# Patient Record
Sex: MALE | Race: OTHER | NOT HISPANIC OR LATINO | URBAN - METROPOLITAN AREA
[De-identification: names, ages, dates, MRNs, and addresses within clinical notes are randomized per-mention and may not be internally consistent; named-entity substitution may affect disease eponyms.]

---

## 2023-06-10 ENCOUNTER — HOSPITAL ENCOUNTER (INPATIENT)
Facility: HOSPITAL | Age: 88
LOS: 2 days | Discharge: HOME HEALTH | DRG: 193 | End: 2023-06-13
Attending: EMERGENCY MEDICINE | Admitting: INTERNAL MEDICINE
Payer: COMMERCIAL

## 2023-06-10 DIAGNOSIS — J18.9 PNEUMONIA: Primary | ICD-10-CM

## 2023-06-10 DIAGNOSIS — R05.9 COUGH: ICD-10-CM

## 2023-06-10 DIAGNOSIS — Z99.81 ON SUPPLEMENTAL OXYGEN THERAPY: ICD-10-CM

## 2023-06-10 DIAGNOSIS — R77.8 ELEVATED TROPONIN LEVEL: ICD-10-CM

## 2023-06-10 DIAGNOSIS — R06.02 SOB (SHORTNESS OF BREATH): ICD-10-CM

## 2023-06-10 DIAGNOSIS — R09.02 HYPOXIA: ICD-10-CM

## 2023-06-10 PROCEDURE — 99285 EMERGENCY DEPT VISIT HI MDM: CPT

## 2023-06-10 PROCEDURE — 93005 ELECTROCARDIOGRAM TRACING: CPT

## 2023-06-11 ENCOUNTER — APPOINTMENT (INPATIENT)
Dept: ULTRASOUND IMAGING | Facility: HOSPITAL | Age: 88
DRG: 193 | End: 2023-06-11
Payer: COMMERCIAL

## 2023-06-11 ENCOUNTER — APPOINTMENT (EMERGENCY)
Dept: CT IMAGING | Facility: HOSPITAL | Age: 88
DRG: 193 | End: 2023-06-11
Payer: COMMERCIAL

## 2023-06-11 PROBLEM — R79.89 ELEVATED LFTS: Status: ACTIVE | Noted: 2023-06-11

## 2023-06-11 PROBLEM — I71.40 ABDOMINAL AORTIC ANEURYSM (AAA) WITHOUT RUPTURE (HCC): Status: ACTIVE | Noted: 2023-06-11

## 2023-06-11 PROBLEM — J44.9 MODERATE COPD (CHRONIC OBSTRUCTIVE PULMONARY DISEASE) (HCC): Status: ACTIVE | Noted: 2023-06-11

## 2023-06-11 PROBLEM — I25.119 CORONARY ARTERY DISEASE INVOLVING NATIVE CORONARY ARTERY OF NATIVE HEART WITH ANGINA PECTORIS (HCC): Chronic | Status: ACTIVE | Noted: 2023-06-11

## 2023-06-11 PROBLEM — Z86.79 HISTORY OF ATRIAL FIBRILLATION: Status: ACTIVE | Noted: 2023-06-11

## 2023-06-11 PROBLEM — I27.20 PULMONARY HYPERTENSION (HCC): Chronic | Status: ACTIVE | Noted: 2023-06-11

## 2023-06-11 PROBLEM — R77.8 ELEVATED TROPONIN: Status: ACTIVE | Noted: 2023-06-11

## 2023-06-11 PROBLEM — J18.9 COMMUNITY ACQUIRED PNEUMONIA: Status: ACTIVE | Noted: 2023-06-11

## 2023-06-11 PROBLEM — J96.11 CHRONIC RESPIRATORY FAILURE WITH HYPOXIA (HCC): Chronic | Status: ACTIVE | Noted: 2023-06-11

## 2023-06-11 LAB
2HR DELTA HS TROPONIN: 2 NG/L
4HR DELTA HS TROPONIN: -1 NG/L
ALBUMIN SERPL BCP-MCNC: 4.2 G/DL (ref 3.5–5)
ALP SERPL-CCNC: 162 U/L (ref 34–104)
ALT SERPL W P-5'-P-CCNC: 61 U/L (ref 7–52)
ANION GAP SERPL CALCULATED.3IONS-SCNC: 9 MMOL/L (ref 4–13)
AST SERPL W P-5'-P-CCNC: 57 U/L (ref 13–39)
ATRIAL RATE: 76 BPM
ATRIAL RATE: 80 BPM
BASOPHILS # BLD AUTO: 0.02 THOUSANDS/ÂΜL (ref 0–0.1)
BASOPHILS NFR BLD AUTO: 0 % (ref 0–1)
BUN SERPL-MCNC: 19 MG/DL (ref 5–25)
CALCIUM SERPL-MCNC: 9.6 MG/DL (ref 8.4–10.2)
CARDIAC TROPONIN I PNL SERPL HS: 49 NG/L
CARDIAC TROPONIN I PNL SERPL HS: 50 NG/L
CARDIAC TROPONIN I PNL SERPL HS: 52 NG/L
CHLORIDE SERPL-SCNC: 102 MMOL/L (ref 96–108)
CO2 SERPL-SCNC: 25 MMOL/L (ref 21–32)
CREAT SERPL-MCNC: 1.2 MG/DL (ref 0.6–1.3)
D DIMER PPP FEU-MCNC: 4.42 UG/ML FEU
EOSINOPHIL # BLD AUTO: 0.05 THOUSAND/ÂΜL (ref 0–0.61)
EOSINOPHIL NFR BLD AUTO: 0 % (ref 0–6)
ERYTHROCYTE [DISTWIDTH] IN BLOOD BY AUTOMATED COUNT: 12.9 % (ref 11.6–15.1)
FLUAV RNA RESP QL NAA+PROBE: NEGATIVE
FLUBV RNA RESP QL NAA+PROBE: NEGATIVE
GFR SERPL CREATININE-BSD FRML MDRD: 51 ML/MIN/1.73SQ M
GLUCOSE SERPL-MCNC: 122 MG/DL (ref 65–140)
HCT VFR BLD AUTO: 44.5 % (ref 36.5–49.3)
HGB BLD-MCNC: 14.6 G/DL (ref 12–17)
IMM GRANULOCYTES # BLD AUTO: 0.04 THOUSAND/UL (ref 0–0.2)
IMM GRANULOCYTES NFR BLD AUTO: 0 % (ref 0–2)
L PNEUMO1 AG UR QL IA.RAPID: NEGATIVE
LYMPHOCYTES # BLD AUTO: 2.22 THOUSANDS/ÂΜL (ref 0.6–4.47)
LYMPHOCYTES NFR BLD AUTO: 20 % (ref 14–44)
MCH RBC QN AUTO: 29.9 PG (ref 26.8–34.3)
MCHC RBC AUTO-ENTMCNC: 32.8 G/DL (ref 31.4–37.4)
MCV RBC AUTO: 91 FL (ref 82–98)
MONOCYTES # BLD AUTO: 1.22 THOUSAND/ÂΜL (ref 0.17–1.22)
MONOCYTES NFR BLD AUTO: 11 % (ref 4–12)
NEUTROPHILS # BLD AUTO: 7.66 THOUSANDS/ÂΜL (ref 1.85–7.62)
NEUTS SEG NFR BLD AUTO: 69 % (ref 43–75)
NRBC BLD AUTO-RTO: 0 /100 WBCS
P AXIS: 50 DEGREES
PLATELET # BLD AUTO: 213 THOUSANDS/UL (ref 149–390)
PMV BLD AUTO: 10.8 FL (ref 8.9–12.7)
POTASSIUM SERPL-SCNC: 4.3 MMOL/L (ref 3.5–5.3)
PR INTERVAL: 300 MS
PROCALCITONIN SERPL-MCNC: 0.18 NG/ML
PROT SERPL-MCNC: 7.3 G/DL (ref 6.4–8.4)
QRS AXIS: 51 DEGREES
QRS AXIS: 54 DEGREES
QRSD INTERVAL: 148 MS
QRSD INTERVAL: 164 MS
QT INTERVAL: 402 MS
QT INTERVAL: 444 MS
QTC INTERVAL: 463 MS
QTC INTERVAL: 499 MS
RBC # BLD AUTO: 4.88 MILLION/UL (ref 3.88–5.62)
RSV RNA RESP QL NAA+PROBE: NEGATIVE
S PNEUM AG UR QL: NEGATIVE
SARS-COV-2 RNA RESP QL NAA+PROBE: NEGATIVE
SODIUM SERPL-SCNC: 136 MMOL/L (ref 135–147)
T WAVE AXIS: 187 DEGREES
T WAVE AXIS: 196 DEGREES
VENTRICULAR RATE: 76 BPM
VENTRICULAR RATE: 80 BPM
WBC # BLD AUTO: 11.21 THOUSAND/UL (ref 4.31–10.16)

## 2023-06-11 PROCEDURE — 93010 ELECTROCARDIOGRAM REPORT: CPT

## 2023-06-11 PROCEDURE — 74177 CT ABD & PELVIS W/CONTRAST: CPT

## 2023-06-11 PROCEDURE — 96360 HYDRATION IV INFUSION INIT: CPT

## 2023-06-11 PROCEDURE — G1004 CDSM NDSC: HCPCS

## 2023-06-11 PROCEDURE — 36415 COLL VENOUS BLD VENIPUNCTURE: CPT

## 2023-06-11 PROCEDURE — 99223 1ST HOSP IP/OBS HIGH 75: CPT | Performed by: INTERNAL MEDICINE

## 2023-06-11 PROCEDURE — 85379 FIBRIN DEGRADATION QUANT: CPT

## 2023-06-11 PROCEDURE — 84484 ASSAY OF TROPONIN QUANT: CPT

## 2023-06-11 PROCEDURE — 87449 NOS EACH ORGANISM AG IA: CPT | Performed by: NURSE PRACTITIONER

## 2023-06-11 PROCEDURE — 93005 ELECTROCARDIOGRAM TRACING: CPT

## 2023-06-11 PROCEDURE — 96361 HYDRATE IV INFUSION ADD-ON: CPT

## 2023-06-11 PROCEDURE — 76705 ECHO EXAM OF ABDOMEN: CPT

## 2023-06-11 PROCEDURE — 80053 COMPREHEN METABOLIC PANEL: CPT

## 2023-06-11 PROCEDURE — 71275 CT ANGIOGRAPHY CHEST: CPT

## 2023-06-11 PROCEDURE — 84145 PROCALCITONIN (PCT): CPT | Performed by: NURSE PRACTITIONER

## 2023-06-11 PROCEDURE — 85025 COMPLETE CBC W/AUTO DIFF WBC: CPT

## 2023-06-11 PROCEDURE — 0241U HB NFCT DS VIR RESP RNA 4 TRGT: CPT

## 2023-06-11 RX ORDER — NITROGLYCERIN 0.4 MG/1
0.4 TABLET SUBLINGUAL
Status: DISCONTINUED | OUTPATIENT
Start: 2023-06-11 | End: 2023-06-13 | Stop reason: HOSPADM

## 2023-06-11 RX ORDER — ALBUTEROL SULFATE 90 UG/1
2 AEROSOL, METERED RESPIRATORY (INHALATION) 4 TIMES DAILY
Status: DISCONTINUED | OUTPATIENT
Start: 2023-06-11 | End: 2023-06-13 | Stop reason: HOSPADM

## 2023-06-11 RX ORDER — LEVALBUTEROL INHALATION SOLUTION 0.63 MG/3ML
0.63 SOLUTION RESPIRATORY (INHALATION) 4 TIMES DAILY PRN
Status: DISCONTINUED | OUTPATIENT
Start: 2023-06-11 | End: 2023-06-13 | Stop reason: HOSPADM

## 2023-06-11 RX ORDER — CEFTRIAXONE 1 G/50ML
1000 INJECTION, SOLUTION INTRAVENOUS ONCE
Status: COMPLETED | OUTPATIENT
Start: 2023-06-11 | End: 2023-06-11

## 2023-06-11 RX ORDER — FLUTICASONE PROPIONATE AND SALMETEROL 250; 50 UG/1; UG/1
1 POWDER RESPIRATORY (INHALATION) 2 TIMES DAILY
COMMUNITY

## 2023-06-11 RX ORDER — GUAIFENESIN/DEXTROMETHORPHAN 100-10MG/5
10 SYRUP ORAL EVERY 4 HOURS PRN
Status: DISCONTINUED | OUTPATIENT
Start: 2023-06-11 | End: 2023-06-13 | Stop reason: HOSPADM

## 2023-06-11 RX ORDER — FINASTERIDE 5 MG/1
5 TABLET, FILM COATED ORAL DAILY
Status: DISCONTINUED | OUTPATIENT
Start: 2023-06-11 | End: 2023-06-13 | Stop reason: HOSPADM

## 2023-06-11 RX ORDER — FUROSEMIDE 20 MG/1
20 TABLET ORAL DAILY
COMMUNITY

## 2023-06-11 RX ORDER — FLUTICASONE FUROATE AND VILANTEROL 200; 25 UG/1; UG/1
1 POWDER RESPIRATORY (INHALATION) DAILY
Status: DISCONTINUED | OUTPATIENT
Start: 2023-06-11 | End: 2023-06-13 | Stop reason: HOSPADM

## 2023-06-11 RX ORDER — LEVOTHYROXINE SODIUM 0.07 MG/1
75 TABLET ORAL
Status: DISCONTINUED | OUTPATIENT
Start: 2023-06-11 | End: 2023-06-13 | Stop reason: HOSPADM

## 2023-06-11 RX ORDER — SIMVASTATIN 10 MG
TABLET ORAL
COMMUNITY

## 2023-06-11 RX ORDER — FINASTERIDE 5 MG/1
5 TABLET, FILM COATED ORAL DAILY
COMMUNITY

## 2023-06-11 RX ORDER — PANTOPRAZOLE SODIUM 20 MG/1
20 TABLET, DELAYED RELEASE ORAL
Status: DISCONTINUED | OUTPATIENT
Start: 2023-06-12 | End: 2023-06-13 | Stop reason: HOSPADM

## 2023-06-11 RX ORDER — ACETAMINOPHEN 325 MG/1
650 TABLET ORAL EVERY 6 HOURS PRN
Status: DISCONTINUED | OUTPATIENT
Start: 2023-06-11 | End: 2023-06-13 | Stop reason: HOSPADM

## 2023-06-11 RX ORDER — LEVOTHYROXINE SODIUM 0.07 MG/1
75 TABLET ORAL
COMMUNITY

## 2023-06-11 RX ORDER — SIMETHICONE 80 MG
80 TABLET,CHEWABLE ORAL 4 TIMES DAILY PRN
Status: DISCONTINUED | OUTPATIENT
Start: 2023-06-11 | End: 2023-06-13 | Stop reason: HOSPADM

## 2023-06-11 RX ORDER — LEVALBUTEROL INHALATION SOLUTION 0.63 MG/3ML
SOLUTION RESPIRATORY (INHALATION)
COMMUNITY
Start: 2023-03-20

## 2023-06-11 RX ORDER — PRAVASTATIN SODIUM 40 MG
40 TABLET ORAL
Status: DISCONTINUED | OUTPATIENT
Start: 2023-06-11 | End: 2023-06-13 | Stop reason: HOSPADM

## 2023-06-11 RX ORDER — MAGNESIUM HYDROXIDE/ALUMINUM HYDROXICE/SIMETHICONE 120; 1200; 1200 MG/30ML; MG/30ML; MG/30ML
30 SUSPENSION ORAL EVERY 6 HOURS PRN
Status: DISCONTINUED | OUTPATIENT
Start: 2023-06-11 | End: 2023-06-13 | Stop reason: HOSPADM

## 2023-06-11 RX ORDER — LOSARTAN POTASSIUM 25 MG/1
25 TABLET ORAL DAILY
Status: DISCONTINUED | OUTPATIENT
Start: 2023-06-11 | End: 2023-06-13 | Stop reason: HOSPADM

## 2023-06-11 RX ORDER — AMLODIPINE BESYLATE 5 MG/1
5 TABLET ORAL DAILY
Status: DISCONTINUED | OUTPATIENT
Start: 2023-06-11 | End: 2023-06-13 | Stop reason: HOSPADM

## 2023-06-11 RX ORDER — HEPARIN SODIUM 5000 [USP'U]/ML
5000 INJECTION, SOLUTION INTRAVENOUS; SUBCUTANEOUS EVERY 8 HOURS SCHEDULED
Status: DISCONTINUED | OUTPATIENT
Start: 2023-06-11 | End: 2023-06-13 | Stop reason: HOSPADM

## 2023-06-11 RX ORDER — CEFTRIAXONE 1 G/50ML
1000 INJECTION, SOLUTION INTRAVENOUS EVERY 24 HOURS
Status: DISCONTINUED | OUTPATIENT
Start: 2023-06-12 | End: 2023-06-13 | Stop reason: HOSPADM

## 2023-06-11 RX ORDER — HYDROCHLOROTHIAZIDE 25 MG/1
25 TABLET ORAL DAILY
COMMUNITY

## 2023-06-11 RX ORDER — CANDESARTAN 4 MG/1
16 TABLET ORAL DAILY
COMMUNITY

## 2023-06-11 RX ORDER — OMEPRAZOLE 20 MG/1
CAPSULE, DELAYED RELEASE ORAL
COMMUNITY

## 2023-06-11 RX ORDER — FUROSEMIDE 20 MG/1
20 TABLET ORAL DAILY
Status: DISCONTINUED | OUTPATIENT
Start: 2023-06-11 | End: 2023-06-13 | Stop reason: HOSPADM

## 2023-06-11 RX ORDER — AMLODIPINE BESYLATE 5 MG/1
5 TABLET ORAL DAILY
COMMUNITY

## 2023-06-11 RX ORDER — GUAIFENESIN 600 MG/1
600 TABLET, EXTENDED RELEASE ORAL 2 TIMES DAILY
Status: DISCONTINUED | OUTPATIENT
Start: 2023-06-11 | End: 2023-06-13 | Stop reason: HOSPADM

## 2023-06-11 RX ORDER — ONDANSETRON 2 MG/ML
4 INJECTION INTRAMUSCULAR; INTRAVENOUS EVERY 6 HOURS PRN
Status: DISCONTINUED | OUTPATIENT
Start: 2023-06-11 | End: 2023-06-13 | Stop reason: HOSPADM

## 2023-06-11 RX ORDER — AZITHROMYCIN 250 MG/1
250 TABLET, FILM COATED ORAL DAILY
Status: DISCONTINUED | OUTPATIENT
Start: 2023-06-11 | End: 2023-06-11

## 2023-06-11 RX ORDER — NITROGLYCERIN 0.4 MG/1
0.4 TABLET SUBLINGUAL
COMMUNITY

## 2023-06-11 RX ORDER — LEVOTHYROXINE SODIUM 0.07 MG/1
75 TABLET ORAL DAILY
COMMUNITY
End: 2023-06-11 | Stop reason: CLARIF

## 2023-06-11 RX ORDER — AZITHROMYCIN 250 MG/1
500 TABLET, FILM COATED ORAL DAILY
Status: DISCONTINUED | OUTPATIENT
Start: 2023-06-12 | End: 2023-06-13 | Stop reason: HOSPADM

## 2023-06-11 RX ORDER — AZITHROMYCIN 250 MG/1
250 TABLET, FILM COATED ORAL DAILY
COMMUNITY
Start: 2023-05-05 | End: 2024-05-04

## 2023-06-11 RX ORDER — MINERAL OIL AND PETROLATUM 150; 830 MG/G; MG/G
OINTMENT OPHTHALMIC
Status: DISCONTINUED | OUTPATIENT
Start: 2023-06-11 | End: 2023-06-13 | Stop reason: HOSPADM

## 2023-06-11 RX ORDER — ASPIRIN 325 MG
325 TABLET ORAL ONCE
Status: COMPLETED | OUTPATIENT
Start: 2023-06-11 | End: 2023-06-11

## 2023-06-11 RX ADMIN — FUROSEMIDE 20 MG: 20 TABLET ORAL at 10:02

## 2023-06-11 RX ADMIN — ALBUTEROL SULFATE 2 PUFF: 90 AEROSOL, METERED RESPIRATORY (INHALATION) at 10:12

## 2023-06-11 RX ADMIN — ALBUTEROL SULFATE 2 PUFF: 90 AEROSOL, METERED RESPIRATORY (INHALATION) at 17:06

## 2023-06-11 RX ADMIN — ALBUTEROL SULFATE 2 PUFF: 90 AEROSOL, METERED RESPIRATORY (INHALATION) at 21:48

## 2023-06-11 RX ADMIN — GLYCERIN, HYPROMELLOSE, POLYETHYLENE GLYCOL 1 DROP: .2; .2; 1 LIQUID OPHTHALMIC at 13:20

## 2023-06-11 RX ADMIN — ASPIRIN 325 MG ORAL TABLET 325 MG: 325 PILL ORAL at 02:49

## 2023-06-11 RX ADMIN — PRAVASTATIN SODIUM 40 MG: 40 TABLET ORAL at 17:06

## 2023-06-11 RX ADMIN — AMLODIPINE BESYLATE 5 MG: 5 TABLET ORAL at 10:02

## 2023-06-11 RX ADMIN — CEFTRIAXONE 1000 MG: 1 INJECTION, SOLUTION INTRAVENOUS at 06:16

## 2023-06-11 RX ADMIN — GUAIFENESIN 600 MG: 600 TABLET, EXTENDED RELEASE ORAL at 17:06

## 2023-06-11 RX ADMIN — LOSARTAN POTASSIUM 25 MG: 25 TABLET, FILM COATED ORAL at 10:02

## 2023-06-11 RX ADMIN — SODIUM CHLORIDE 500 ML: 0.9 INJECTION, SOLUTION INTRAVENOUS at 03:15

## 2023-06-11 RX ADMIN — FLUTICASONE FUROATE AND VILANTEROL TRIFENATATE 1 PUFF: 200; 25 POWDER RESPIRATORY (INHALATION) at 10:12

## 2023-06-11 RX ADMIN — GLYCERIN, HYPROMELLOSE, POLYETHYLENE GLYCOL 1 DROP: .2; .2; 1 LIQUID OPHTHALMIC at 17:06

## 2023-06-11 RX ADMIN — HEPARIN SODIUM 5000 UNITS: 5000 INJECTION INTRAVENOUS; SUBCUTANEOUS at 21:48

## 2023-06-11 RX ADMIN — ALBUTEROL SULFATE 2 PUFF: 90 AEROSOL, METERED RESPIRATORY (INHALATION) at 13:27

## 2023-06-11 RX ADMIN — GUAIFENESIN 600 MG: 600 TABLET, EXTENDED RELEASE ORAL at 10:02

## 2023-06-11 RX ADMIN — GLYCERIN, HYPROMELLOSE, POLYETHYLENE GLYCOL 1 DROP: .2; .2; 1 LIQUID OPHTHALMIC at 21:48

## 2023-06-11 RX ADMIN — FINASTERIDE 5 MG: 5 TABLET, FILM COATED ORAL at 10:02

## 2023-06-11 RX ADMIN — HEPARIN SODIUM 5000 UNITS: 5000 INJECTION INTRAVENOUS; SUBCUTANEOUS at 13:20

## 2023-06-11 RX ADMIN — WHITE PETROLATUM 57.7 %-MINERAL OIL 31.9 % EYE OINTMENT: at 21:48

## 2023-06-11 RX ADMIN — IOHEXOL 100 ML: 350 INJECTION, SOLUTION INTRAVENOUS at 03:19

## 2023-06-11 NOTE — PLAN OF CARE
Problem: PAIN - ADULT  Goal: Verbalizes/displays adequate comfort level or baseline comfort level  Description: Interventions:  - Encourage patient to monitor pain and request assistance  - Assess pain using appropriate pain scale  - Administer analgesics based on type and severity of pain and evaluate response  - Implement non-pharmacological measures as appropriate and evaluate response  - Consider cultural and social influences on pain and pain management  - Notify physician/advanced practitioner if interventions unsuccessful or patient reports new pain  Outcome: Progressing     Problem: INFECTION - ADULT  Goal: Absence or prevention of progression during hospitalization  Description: INTERVENTIONS:  - Assess and monitor for signs and symptoms of infection  - Monitor lab/diagnostic results  - Monitor all insertion sites, i e  indwelling lines, tubes, and drains  - Monitor endotracheal if appropriate and nasal secretions for changes in amount and color  - [place] appropriate cooling/warming therapies per order  - Administer medications as ordered  - Instruct and encourage patient and family to use good hand hygiene technique  - Identify and instruct in appropriate isolation precautions for identified infection/condition  Outcome: Progressing  Goal: Absence of fever/infection during neutropenic period  Description: INTERVENTIONS:  - Monitor WBC    Outcome: Progressing     Problem: SAFETY ADULT  Goal: Patient will remain free of falls  Description: INTERVENTIONS:  - Educate patient/family on patient safety including physical limitations  - Instruct patient to call for assistance with activity   - Consult OT/PT to assist with strengthening/mobility   - Keep Call bell within reach  - Keep bed low and locked with side rails adjusted as appropriate  - Keep care items and personal belongings within reach  - Initiate and maintain comfort rounds  - Make Fall Risk Sign visible to staff  - Offer Toileting every  Hours, in advance of need  - Initiate/Maintain alarm  - Obtain necessary fall risk management equipment:   - Apply yellow socks and bracelet for high fall risk patients  - Consider moving patient to room near nurses station  Outcome: Progressing  Goal: Maintain or return to baseline ADL function  Description: INTERVENTIONS:  -  Assess patient's ability to carry out ADLs; assess patient's baseline for ADL function and identify physical deficits which impact ability to perform ADLs (bathing, care of mouth/teeth, toileting, grooming, dressing, etc )  - Assess/evaluate cause of self-care deficits   - Assess range of motion  - Assess patient's mobility; develop plan if impaired  - Assess patient's need for assistive devices and provide as appropriate  - Encourage maximum independence but intervene and supervise when necessary  - Involve family in performance of ADLs  - Assess for home care needs following discharge   - Consider OT consult to assist with ADL evaluation and planning for discharge  - Provide patient education as appropriate  Outcome: Progressing  Goal: Maintains/Returns to pre admission functional level  Description: INTERVENTIONS:  - Perform BMAT or MOVE assessment daily    - Set and communicate daily mobility goal to care team and patient/family/caregiver  - Collaborate with rehabilitation services on mobility goals if consulted  - Perform Range of Motion  times a day  - Reposition patient every  hours    - Dangle patient  times a day  - Stand patient  times a day  - Ambulate patient  times a day  - Out of bed to chair  times a day   - Out of bed for meals times a day  - Out of bed for toileting  - Record patient progress and toleration of activity level   Outcome: Progressing     Problem: DISCHARGE PLANNING  Goal: Discharge to home or other facility with appropriate resources  Description: INTERVENTIONS:  - Identify barriers to discharge w/patient and caregiver  - Arrange for needed discharge resources and transportation as appropriate  - Identify discharge learning needs (meds, wound care, etc )  - Arrange for interpretive services to assist at discharge as needed  - Refer to Case Management Department for coordinating discharge planning if the patient needs post-hospital services based on physician/advanced practitioner order or complex needs related to functional status, cognitive ability, or social support system  Outcome: Progressing     Problem: Knowledge Deficit  Goal: Patient/family/caregiver demonstrates understanding of disease process, treatment plan, medications, and discharge instructions  Description: Complete learning assessment and assess knowledge base    Interventions:  - Provide teaching at level of understanding  - Provide teaching via preferred learning methods  Outcome: Progressing

## 2023-06-11 NOTE — ASSESSMENT & PLAN NOTE
· Troponin 50-->52  · Received ASA bolus in ED  · EKG: Sinus rhythm with first-degree AV block, RBBB rate 76    Similar to EKG from [facility]   · ECHO EF 60-65%, normal wall motion  · Cycle troponin and EKG  · Monitor on telemetry

## 2023-06-11 NOTE — ED PROVIDER NOTES
History  Chief Complaint   Patient presents with   • Shortness of Breath     Patient arrives via ems coming from home c/o of sob worsening  tonight, patient received 125 solumederol, and duo neb pta     81 y/o male with PMH of CHF, COPD, HTN, CAD, s/p aortic valve replacement presenting to the ED via EMS with complaints of shortness of breath and cough x1 week  Patient is accompanied by wife who provides majority of history  Wife states the patient developed worsening cough producing yellow sputum over past few days  States the patient was diaphoretic at home and decided to be evaluated in ED  He has been subjectively feverish at home however states that the patient is always feeling warm  Patient uses at home COPD treatments w/ little relief of SOB  Per patients wife, he has never been hospitalized for COPD exacerbation before  Has intermittent wheezing but no significant wheeze today  Denies lightheadedness, headache, confusion, chest pain, palpitations, PND, pleuritic chest pain, history of PE/DVT, bleeding/clotting disorder, family history of PE/DVT, hemoptysis, recent prolonged travel, recent injury/trauma to the lower extremities, recent surgery, history of cancer, estrogen therapy, N/V/D, urinary complaints, weakness, confusion and any other complaint  States he does sometimes get right lower leg swelling however attributes it to his knee issues on the right  Has otherwise been eating and drinking normally  No known sick contacts or recent travel outside the [address] [facility] [address]  Patient quit smoking over 20 years ago  Was given 1 DuoNeb treatment and 125 mg Solu-Medrol by EMS in route to the ED  Prior to Admission Medications   Prescriptions Last Dose Informant Patient Reported? Taking?    Cholecalciferol 25 MCG (1000 UT) CHEW   Yes No   Sig: Chew   Fluticasone-Salmeterol (Advair Diskus) 250-50 mcg/dose inhaler   Yes No   Si puff 2 (two) times a day   amLODIPine (NORVASC) 5 mg tablet   Yes No   Sig: Take 5 mg by mouth daily   azithromycin (ZITHROMAX) 250 mg tablet   Yes Yes   Sig: Take 250 mg by mouth daily   candesartan (ATACAND) 4 mg tablet   Yes No   Sig: Take 16 mg by mouth daily   finasteride (PROSCAR) 5 mg tablet   Yes No   Sig: Take 5 mg by mouth daily   furosemide (LASIX) 20 mg tablet   Yes Yes   Sig: Take 20 mg by mouth daily   hydrochlorothiazide (HYDRODIURIL) 25 mg tablet   Yes No   Sig: Take 25 mg by mouth daily   ipratropium-albuterol (COMBIVENT RESPIMAT) inhaler   Yes No   Si puff 4 (four) times a day as needed   levalbuterol (Xopenex) 0 63 mg/3 mL nebulizer solution   Yes Yes   levothyroxine 75 mcg tablet   Yes No   Sig: Take 75 mcg by mouth daily in the early morning   nitroglycerin (NITROSTAT) 0 4 mg SL tablet   Yes Yes   Sig: Place 0 4 mg under the tongue every 5 (five) minutes as needed for chest pain   omeprazole (PriLOSEC) 20 mg delayed release capsule   Yes No   Sig: Take by mouth   simvastatin (ZOCOR) 10 mg tablet   Yes No   Sig: Take by mouth      Facility-Administered Medications: None       No past medical history on file  No past surgical history on file  No family history on file  I have reviewed and agree with the history as documented  No existing history information found  No existing history information found  Review of Systems   Constitutional: Positive for fever  Respiratory: Positive for cough and shortness of breath  All other systems reviewed and are negative  Physical Exam  Physical Exam  Vitals and nursing note reviewed  Constitutional:       General: He is not in acute distress  Appearance: He is well-developed  Comments: Awake, alert, interactive and resting in the stretcher in no acute distress  Patient is not ill or toxic appearing  HENT:      Head: Normocephalic and atraumatic  Mouth/Throat:      Mouth: Mucous membranes are moist       Pharynx: Oropharynx is clear     Eyes:      Conjunctiva/sclera: Conjunctivae normal  Neck:      Vascular: No JVD  Cardiovascular:      Rate and Rhythm: Normal rate and regular rhythm  Heart sounds: No murmur heard  Pulmonary:      Effort: Pulmonary effort is normal  Tachypnea present  No respiratory distress  Breath sounds: No stridor  Examination of the left-upper field reveals decreased breath sounds  Examination of the left-lower field reveals decreased breath sounds  Decreased breath sounds present  No wheezing, rhonchi or rales  Abdominal:      Palpations: Abdomen is soft  Tenderness: There is no abdominal tenderness  Musculoskeletal:         General: No swelling  Cervical back: Neck supple  Right lower leg: No tenderness  No edema  Left lower leg: No tenderness  No edema  Lymphadenopathy:      Cervical: No cervical adenopathy  Skin:     General: Skin is warm and dry  Capillary Refill: Capillary refill takes less than 2 seconds  Neurological:      General: No focal deficit present  Mental Status: He is alert and oriented to person, place, and time  GCS: GCS eye subscore is 4  GCS verbal subscore is 5  GCS motor subscore is 6     Psychiatric:         Mood and Affect: Mood normal          Vital Signs  ED Triage Vitals   Temperature Pulse Respirations Blood Pressure SpO2   06/10/23 2315 06/10/23 2315 06/10/23 2315 06/10/23 2315 06/10/23 2315   99 8 °F (37 7 °C) 88 (!) 26 132/66 94 %      Temp Source Heart Rate Source Patient Position - Orthostatic VS BP Location FiO2 (%)   06/11/23 0600 06/10/23 2315 06/11/23 0042 06/11/23 0042 --   Oral Monitor Lying Right arm       Pain Score       --                  Vitals:    06/11/23 0315 06/11/23 0400 06/11/23 0500 06/11/23 0600   BP: 160/72 170/76 139/66 149/79   Pulse: 77 75 75 75   Patient Position - Orthostatic VS: Lying Lying Lying Lying         Visual Acuity      ED Medications  Medications   cefTRIAXone (ROCEPHIN) IVPB (premix in dextrose) 1,000 mg 50 mL (1,000 mg Intravenous New Bag 6/11/23 8426)   glycerin-hypromellose- (ARTIFICIAL TEARS) ophthalmic solution 1 drop (has no administration in time range)   artificial tear (LUBRIFRESH P M ) ophthalmic ointment (has no administration in time range)   aspirin tablet 325 mg (325 mg Oral Given 6/11/23 0249)   sodium chloride 0 9 % bolus 500 mL (0 mL Intravenous Stopped 6/11/23 0446)   iohexol (OMNIPAQUE) 350 MG/ML injection (SINGLE-DOSE) 100 mL (100 mL Intravenous Given 6/11/23 0319)       Diagnostic Studies  Results Reviewed     Procedure Component Value Units Date/Time    Procalcitonin [[id number]]     Lab Status: No result Specimen: Blood     FLU/RSV/COVID - if FLU/RSV clinically relevant [[id number]]  (Normal) Collected: 06/11/23 0033    Lab Status: Final result Specimen: Nares from Nose Updated: 06/11/23 0617     SARS-CoV-2 Negative     INFLUENZA A PCR Negative     INFLUENZA B PCR Negative     RSV PCR Negative    Narrative:      FOR PEDIATRIC PATIENTS - copy/paste COVID Guidelines URL to browser: [email or web address]  [employer]    SARS-CoV-2 assay is a Nucleic Acid Amplification assay intended for the  qualitative detection of nucleic acid from SARS-CoV-2 in nasopharyngeal  swabs  Results are for the presumptive identification of SARS-CoV-2 RNA  Positive results are indicative of infection with SARS-CoV-2, the virus  causing COVID-19, but do not rule out bacterial infection or co-infection  with other viruses  Laboratories within the [place] and its  territories are required to report all positive results to the appropriate  public health authorities  Negative results do not preclude SARS-CoV-2  infection and should not be used as the sole basis for treatment or other  patient management decisions  Negative results must be combined with  clinical observations, patient history, and epidemiological information  This test has not been FDA cleared or approved      This test has been authorized by FDA "under an Emergency Use Authorization  (EUA)  This test is only authorized for the duration of time the  declaration that circumstances exist justifying the authorization of the  emergency use of an in vitro diagnostic tests for detection of SARS-CoV-2  virus and/or diagnosis of COVID-19 infection under section 564(b)(1) of  the Act, 21 U  S C  039JYY-6(R)(2), unless the authorization is terminated  or revoked sooner  The test has been validated but independent review by FDA  and CLIA is pending  Test performed using [employer]: This RT-PCR assay targets N2,  a region unique to SARS-CoV-2  A conserved region in the E-gene was chosen  for pan-Sarbecovirus detection which includes SARS-CoV-2  According to CMS-2020-01-R, this platform meets the definition of high-throughput technology  HS Troponin I 4hr [[id number]]  (Normal) Collected: 06/11/23 0457    Lab Status: Final result Specimen: Blood from Arm, Left Updated: 06/11/23 0532     hs TnI 4hr 49 ng/L      Delta 4hr hsTnI -1 ng/L     HS Troponin I 2hr [[id number]]  (Abnormal) Collected: 06/11/23 0254    Lab Status: Final result Specimen: Blood from Arm, Left Updated: 06/11/23 0322     hs TnI 2hr 52 ng/L      Delta 2hr hsTnI 2 ng/L     Comprehensive metabolic panel [[id number]]  (Abnormal) Collected: 06/11/23 0036    Lab Status: Final result Specimen: Blood from Arm, Left Updated: 06/11/23 0123     Sodium 136 mmol/L      Potassium 4 3 mmol/L      Chloride 102 mmol/L      CO2 25 mmol/L      ANION GAP 9 mmol/L      BUN 19 mg/dL      Creatinine 1 20 mg/dL      Glucose 122 mg/dL      Calcium 9 6 mg/dL      AST 57 U/L      ALT 61 U/L      Alkaline Phosphatase 162 U/L      Total Protein 7 3 g/dL      Albumin 4 2 g/dL      Total Bilirubin --     eGFR 51 ml/min/1 73sq m     Narrative:      TBIL not available at this moment  Please call the lab at  if you would like TBIL; it will be sent to [facility]  \"        [place] guidelines " for Chronic Kidney Disease (CKD):   •  Stage 1 with normal or high GFR (GFR > 90 mL/min/1 73 square meters)  •  Stage 2 Mild CKD (GFR = 60-89 mL/min/1 73 square meters)  •  Stage 3A Moderate CKD (GFR = 45-59 mL/min/1 73 square meters)  •  Stage 3B Moderate CKD (GFR = 30-44 mL/min/1 73 square meters)  •  Stage 4 Severe CKD (GFR = 15-29 mL/min/1 73 square meters)  •  Stage 5 End Stage CKD (GFR <15 mL/min/1 73 square meters)  Note: GFR calculation is accurate only with a steady state creatinine    HS Troponin 0hr (reflex protocol) [[id number]]  (Abnormal) Collected: 06/11/23 0036    Lab Status: Final result Specimen: Blood from Arm, Left Updated: 06/11/23 0117     hs TnI 0hr 50 ng/L     D-Dimer [[id number]]  (Abnormal) Collected: 06/11/23 0036    Lab Status: Final result Specimen: Blood from Arm, Left Updated: 06/11/23 0116     D-Dimer, Quant 4 42 ug/ml FEU     Narrative: In the evaluation for possible pulmonary embolism, in the appropriate (Well's Score of 4 or less) patient, the age adjusted d-dimer cutoff for this patient can be calculated as:    Age x 0 01 (in ug/mL) for Age-adjusted D-dimer exclusion threshold for a patient over 50 years      CBC and differential [[id number]]  (Abnormal) Collected: 06/11/23 0036    Lab Status: Final result Specimen: Blood from Arm, Left Updated: 06/11/23 0051     WBC 11 21 Thousand/uL      RBC 4 88 Million/uL      Hemoglobin 14 6 g/dL      Hematocrit 44 5 %      MCV 91 fL      MCH 29 9 pg      MCHC 32 8 g/dL      RDW 12 9 %      MPV 10 8 fL      Platelets 874 Thousands/uL      nRBC 0 /100 WBCs      Neutrophils Relative 69 %      Immat GRANS % 0 %      Lymphocytes Relative 20 %      Monocytes Relative 11 %      Eosinophils Relative 0 %      Basophils Relative 0 %      Neutrophils Absolute 7 66 Thousands/µL      Immature Grans Absolute 0 04 Thousand/uL      Lymphocytes Absolute 2 22 Thousands/µL      Monocytes Absolute 1 22 Thousand/µL      Eosinophils Absolute 0 05 Thousand/µL Basophils Absolute 0 02 Thousands/µL                  CTA chest (pe study) abdomen pelvis routine contrast   Final Result by Elaine Roberts MD (06/11 0422)      1  No acute pulmonary embolism to the segmental level with evaluation of the subsegmental arterial branches limited by motion artifact  Dilatation of the main pulmonary artery noted suggestive of pulmonary arterial hypertension  2   Mild upper lobe predominant centrilobular emphysema  Nonspecific subtle perihilar infiltrates in the right middle lobe and portions of the right lower lobe noted  Small area of subpleural groundglass opacity in the left upper lobe also noted  Findings could indicate early multifocal infection  Trace right pleural effusion with dependent atelectasis and focal pleural thickening  3   Multiple nonspecific mildly prominent mediastinal and hilar lymph nodes noted with the largest measuring up to 1 9 x 1 2 cm  4   Mild cardiomegaly  5   Calcific atherosclerosis of the aortic arch region, proximal thoracic vessels, and coronary arteries noted  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  6   3 2 cm infrarenal abdominal aortic aneurysm, recommend follow-up imaging every 3 years per current guidelines  7   Cholelithiasis without evidence for acute cholecystitis or significant biliary ductal dilatation  8   Colonic diverticulosis without evidence for acute diverticulitis  No evidence of bowel obstruction, mesenteric inflammation, obstructive uropathy, free air, or free fluid  9   Prominent size of the prostate gland  10   Mesenteric fat-containing renal hernias, right greater than left        Workstation performed: [id number]                    Procedures  ECG 12 Lead Documentation Only    Date/Time: 6/11/2023 12:00 AM    Performed by: Leena Armstrong PA-C  Authorized by: Leena Armstrong PA-C    ECG reviewed by me, the ED Provider: yes    Patient location:  ED  Previous ECG:     Previous ECG: Unavailable  Rate:     ECG rate:  80    ECG rate assessment: normal    Rhythm:     Rhythm: sinus rhythm    Ectopy:     Ectopy: none    QRS:     QRS axis:  Normal    QRS intervals: Wide  Conduction:     Conduction: abnormal      Abnormal conduction comment:  RBBB  ST segments:     ST segments:  Abnormal    Depression:  V5, V6, II, aVF, V2, V3 and V4  T waves:     T waves: inverted      Inverted:  II, aVF, V2, V3, V4, V5 and V6    ECG 12 Lead Documentation Only    Date/Time: 6/11/2023 6:13 AM    Performed by: Carlos Hallman PA-C  Authorized by: Carlos Hallman PA-C    Indications / Diagnosis:  Repeat   ECG reviewed by me, the ED Provider: yes    Patient location:  ED  Previous ECG:     Previous ECG:  Unavailable  Rate:     ECG rate:  76    ECG rate assessment: normal    Rhythm:     Rhythm: sinus rhythm and A-V block    Ectopy:     Ectopy: none    QRS:     QRS axis:  Normal    QRS intervals: Wide  Conduction:     Conduction: abnormal      Abnormal conduction: 1st degree      Abnormal conduction comment:  RBBB  ST segments:     ST segments:  Abnormal    Depression:  AVF, I, II, aVL, V2, V3, V4 and V5  T waves:     T waves: normal               ED Course  ED Course as of 06/11/23 0644   Sun Jun 11, 2023   0447 IMPRESSION:     1  No acute pulmonary embolism to the segmental level with evaluation of the subsegmental arterial branches limited by motion artifact  Dilatation of the main pulmonary artery noted suggestive of pulmonary arterial hypertension  2   Mild upper lobe predominant centrilobular emphysema  Nonspecific subtle perihilar infiltrates in the right middle lobe and portions of the right lower lobe noted  Small area of subpleural groundglass opacity in the left upper lobe also noted  Findings could indicate early multifocal infection  Trace right pleural effusion with dependent atelectasis and focal pleural thickening    3   Multiple nonspecific mildly prominent mediastinal and hilar lymph nodes noted with the largest measuring up to 1 9 x 1 2 cm  4   Mild cardiomegaly  5   Calcific atherosclerosis of the aortic arch region, proximal thoracic vessels, and coronary arteries noted  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  6   3 2 cm infrarenal abdominal aortic aneurysm, recommend follow-up imaging every 3 years per current guidelines  7   Cholelithiasis without evidence for acute cholecystitis or significant biliary ductal dilatation  8   Colonic diverticulosis without evidence for acute diverticulitis  No evidence of bowel obstruction, mesenteric inflammation, obstructive uropathy, free air, or free fluid  9   Prominent size of the prostate gland  10   Mesenteric fat-containing renal hernias, right greater than left  6072 Give 1 dose of Rocephin in light of possible pneumonia  We will plan to admit patient in light of CT findings, cough, SOB and new hypoxia requiring supplemental oxygen  Patient agreeable to admission  9800 Attempted to take patient off of supplemental oxygen to assess oxygen saturation as there was no documented O2 sat on room air  Patient dropped down to 86% before being put back on 4L simple mask  1230 [facility] accepts patient as inpatient under Dr Jaclyn Loredo for reevaluation and further management  Patient continues to agree to be admitted  Stable at admission               HEART Risk Score    Flowsheet Row Most Recent Value   Heart Score Risk Calculator    History 1 Filed at: 06/11/2023 0643   ECG 1 Filed at: 06/11/2023 2311   Age 2 Filed at: 06/11/2023 0643   Risk Factors 2 Filed at: 06/11/2023 0643   Troponin 2 Filed at: 06/11/2023 5071   HEART Score 8 Filed at: 06/11/2023 7319                PERC Rule for PE    Flowsheet Row Most Recent Value   PERC Rule for PE    Age >=50 1 Filed at: 06/11/2023 0643   HR >=100 0 Filed at: 06/11/2023 0643   O2 Sat on room air < 95% 1 Filed at: 06/11/2023 5509   History of PE or DVT 0 Filed at: 06/11/2023 6326   Recent trauma or surgery 0 Filed at: 06/11/2023 0643   Hemoptysis 0 Filed at: 06/11/2023 7408   Exogenous estrogen 0 Filed at: 06/11/2023 2101   Unilateral leg swelling 0 Filed at: 06/11/2023 2669   PERC Rule for PE Results 2 Filed at: 06/11/2023 4681              SBIRT 20yo+    Flowsheet Row Most Recent Value   Initial Alcohol Screen: US AUDIT-C     1  How often do you have a drink containing alcohol? 0 Filed at: 06/10/2023 2357   2  How many drinks containing alcohol do you have on a typical day you are drinking? 0 Filed at: 06/10/2023 2357   3a  Male UNDER 65: How often do you have five or more drinks on one occasion? 0 Filed at: 06/10/2023 2357   3b  FEMALE Any Age, or MALE 65+: How often do you have 4 or more drinks on one occassion? 0 Filed at: 06/10/2023 2357   Audit-C Score 0 Filed at: 06/10/2023 2357   SYED: How many times in the past year have you    Used an illegal drug or used a prescription medication for non-medical reasons? Never Filed at: 06/10/2023 2357          Wells' Criteria for PE    Flowsheet Row Most Recent Value   Wells' Criteria for PE    Clinical signs and symptoms of DVT 0 Filed at: 06/11/2023 2563   PE is primary diagnosis or equally likely 0 Filed at: 06/11/2023 0643   HR >100 0 Filed at: 06/11/2023 0643   Immobilization at least 3 days or Surgery in the previous 4 weeks 0 Filed at: 06/11/2023 9361   Previous, objectively diagnosed PE or DVT 0 Filed at: 06/11/2023 0643   Hemoptysis 0 Filed at: 06/11/2023 6245   Malignancy with treatment within 6 months or palliative 0 Filed at: 06/11/2023 2579   Wells' Criteria Total 0 Filed at: 06/11/2023 9185                Medical Decision Making  80-year-old male presented to ED with complaints of worsening cough with associated SOB over the last week  Patient does have a history of COPD and uses his albuterol treatments daily on a scheduled basis however has never had an ED visit for COPD exacerbation    States he does sometimes get wheezing but no significant wheezing today   Does have some yellow sputum production  Patient and wife adamantly denied the patient uses supplemental oxygen at home  States patient does always feel warm but did have some diaphoresis along with subjective fever today  Has no other specific complaints today  No chest pain  No known sick contacts or recent travel outside the [address] [place] [address] [address]  On exam patient is awake, alert, interactive and in no acute distress  At my initial evaluation patient was already on 4 L NC at 92- 94%  There is no documented hypoxia or initial O2 saturation on EMS arrival   Slightly tachypneic with some decreased breath sounds on the left however no rhonchi, rales, wheezing or respiratory distress  Patient is able to speak in full sentences without issue  PE otherwise unremarkable  GCS 15  A&O x3  DDX including but not limited to: pneumonia, pleural effusion,, PE, PTX, ACS, MI, COPD exacerbation, COVID 19, influenza, anemia, metabolic abnormality, renal failure  Doubt CHF as patient does not appear volume overloaded on exam   We will check a CBC for signs of anemia, CMP for electrolytes, liver enzymes and renal function, EKG/troponin to rule out ACS/arrhythmia/MI as possible cause of patient's presenting complaints  In light of no wheezing on exam will defer further albuterol treatments at this time  Will also check a D-dimer in light of complaints of worsening shortness of breath and new suspected hypoxia  Will also check a COVID/flu/RSV  Disposition pending labs, imaging and reevaluation  Cough: acute illness or injury  Elevated troponin level: acute illness or injury  Hypoxia: acute illness or injury  On supplemental oxygen therapy: acute illness or injury  Pneumonia: acute illness or injury  SOB (shortness of breath): acute illness or injury  Amount and/or Complexity of Data Reviewed  Labs: ordered  Radiology: ordered  Risk  Decision regarding hospitalization            Disposition  Final diagnoses:   Pneumonia Cough   SOB (shortness of breath)   Hypoxia   On supplemental oxygen therapy   Elevated troponin level     Time reflects when diagnosis was documented in both MDM as applicable and the Disposition within this note     Time User Action Codes Description Comment    6/11/2023  6:03 AM Devan Boeck Add [J18 9] Pneumonia     6/11/2023  6:03 AM Delicia Maldonado Add [R05 9] Cough     6/11/2023  6:03 AM Delicia Lim Add [R06 02] SOB (shortness of breath)     6/11/2023  6:03 AM Devan Boeck Add [R09 02] Hypoxia     6/11/2023  6:04 AM Devan Boeck Add [Z99 81] On supplemental oxygen therapy     6/11/2023  6:04 AM Devna Boeck Modify [J18 9] Pneumonia     6/11/2023  6:04 AM Devan Boeck Modify [R05 9] Cough     6/11/2023  6:04 AM Devan Boeck Modify [J18 9] Pneumonia     6/11/2023  6:04 AM Devan Boeck Modify [R05 9] Cough     6/11/2023  6:04 AM Delicia Lim Add [R77 8] Elevated troponin level       ED Disposition     ED Disposition   Admit    Condition   Stable    Date/Time   Sun Jun 11, 2023  6:03 AM    Comment   Case was discussed with [facility] and the patient's admission status was agreed to be Admission Status: inpatient status to the service of Dr Damien Lisa  Follow-up Information    None         Patient's Medications   Discharge Prescriptions    No medications on file       No discharge procedures on file      PDMP Review     None          ED Provider  Electronically Signed by           Shweta Pruett PA-C  06/13/23 5088

## 2023-06-11 NOTE — ASSESSMENT & PLAN NOTE
· Incidental finding on CTA PE study of 3 2 cm infrarenal abdominal aortic aneurysm  Recommend follow-up imaging every 3 years per current guidelines

## 2023-06-11 NOTE — ASSESSMENT & PLAN NOTE
· Known hx of pulmonary arterial hypertension  · CTA PE:  Dilatation of the main pulmonary artery noted suggestive of pulmonary arterial hypertension

## 2023-06-11 NOTE — H&P
[address]  H&P  Name: Kyler Collazo 80 y o  male I MRN: [id number]  Unit/Bed#: ED-05 I Date of Admission: 6/10/2023   Date of Service: 6/11/2023 I Hospital Day: 0      Assessment/Plan   * Chronic respiratory failure with hypoxia (HCC)  Assessment & Plan  · O2 sat 86% on room air  Not maintained on oxygen at home  · Currently satting 89-90% on 4 L  · History of COPD and pulmonary hypertension  Follows with cardiology and pulmonology at [facility]  · CTA: neg for PE    · Mild upper lobe centrilobular emphysema  · Nonspecific subtle perihilar infiltrates in the right middle lobe and portions of the right lower lobe noted  Small area of subpleural groundglass opacity in the left upper lobe also noted; could indicate early multifocal infection  · Trace right pleural effusion with dependent atelectasis and focal pleural thickening  · Check PCT and urine antigens  · Prophylactically treat for CAP with IV ceftriaxone  · Continue home inhalers and COPD prophylactic azithromycin 250 mg daily  · PRN nebs    · Evaluate for home O2  Check ambulatory pulse ox  · Incentive spirometer    Community acquired pneumonia  Assessment & Plan  · See above respiratory failure    Abdominal aortic aneurysm (AAA) without rupture (HCC)  Assessment & Plan  · Incidental finding on CTA PE study of 3 2 cm infrarenal abdominal aortic aneurysm  Recommend follow-up imaging every 3 years per current guidelines  Elevated LFTs  Assessment & Plan  · Minimal elevation in LFTs  · CTA: Cholelithiasis without evidence for acute cholecystitis or significant biliary ductal dilatation  · Check RUQ US  · Trend CMP  · Consider GI consult    Elevated troponin  Assessment & Plan  · Troponin 50-->52  · Received ASA bolus in ED  · EKG: Sinus rhythm with first-degree AV block, RBBB rate 76    Similar to EKG from [facility]   · ECHO EF 60-65%, normal wall motion  · Cycle troponin and EKG  · Monitor on telemetry    Coronary artery disease involving native coronary artery of native heart with angina pectoris [facility])  Assessment & Plan  · CAD s/p CABG  · S/p Bio AVR at the time of CABG  · Follows with cardiology at [facility] THE [facility]    History of atrial fibrillation  Assessment & Plan  · Per [facility] cardiology paroxysmal atrial fibrillation detected in 2013 related to hypothyroid  Corrected since  Pulmonary hypertension (HCC)  Assessment & Plan  · Known hx of pulmonary arterial hypertension  · CTA PE:  Dilatation of the main pulmonary artery noted suggestive of pulmonary arterial hypertension  Moderate COPD (chronic obstructive pulmonary disease) ([facility])  Assessment & Plan  · History of moderate COPD, bronchiectasis and chronic cough  Maintained on Advair BID, Combivent and Azithromycin 250 mg daily  · Followed outpatient by pulmonology at [facility]       VTE Prophylaxis: Heparin  / sequential compression device   Code Status: FC by default  POLST: POLST is not applicable to this patient  Discussion with family:     Anticipated Length of Stay:  Patient will be admitted on an Inpatient basis with an anticipated length of stay of  > 2 midnights  Justification for Hospital Stay: Acute respiratory failure    Total Time for Visit, including Counseling / Coordination of Care: 45 minutes  Greater than 50% of this total time spent on direct patient counseling and coordination of care  Chief Complaint:       History of Present Illness:    Randy Feldman is a 80 y o  male who presents with dyspnea  Unable to obtain HPI  Per ED he presented with complaints of shortness of breath, worsening overnight  Spouse reported fevers, diaphoresis, cough and yellow sputum  Noted with hypoxia requiring supplemental O2  Now maintained on home oxygen  Review of Systems:    Review of Systems   Unable to perform ROS: Other       Past Medical and Surgical History:     No past medical history on file  No past surgical history on file      Meds/Allergies:    Prior to Admission medications    Medication Sig Start Date End Date Taking? Authorizing Provider   azithromycin (ZITHROMAX) 250 mg tablet Take 250 mg by mouth daily 5/5/23 5/4/24 Yes Historical Provider, MD   levalbuterol (Xopenex) 0 63 mg/3 mL nebulizer solution  3/20/23  Yes Historical Provider, MD   amLODIPine (NORVASC) 5 mg tablet Take by mouth    Historical Provider, MD   candesartan (ATACAND) 4 mg tablet Take by mouth    Historical Provider, MD   Cholecalciferol 25 MCG (1000 UT) [facility] 16    Historical Provider, MD   finasteride (PROSCAR) 5 mg tablet Take by mouth    Historical Provider, MD   Fluticasone-Salmeterol (Advair Diskus) 250-50 mcg/dose inhaler     Historical Provider, MD   hydrochlorothiazide (HYDRODIURIL) 25 mg tablet Take by mouth    Historical Provider, MD   ipratropium-albuterol (Combivent Respimat) inhaler     Historical Provider, MD   levothyroxine (Synthroid) 50 mcg tablet Take by mouth    Historical Provider, MD   levothyroxine 75 mcg tablet Take 75 mcg by mouth daily    Historical Provider, MD   omeprazole (PriLOSEC) 20 mg delayed release capsule Take by mouth    Historical Provider, MD   simvastatin (ZOCOR) 10 mg tablet Take by mouth    Historical Provider, MD     I have reviewed home medications using allscripts  Allergies: No Known Allergies    Social History:     Marital Status: /Civil Union   Occupation: retired  Patient Pre-hospital Living Situation: resides at home w spouse  Patient Pre-hospital Level of Mobility: ambulates  Patient Pre-hospital Diet Restrictions:   Substance Use History:   Social History     Substance and Sexual Activity   Alcohol Use Not on file     Social History     Tobacco Use   Smoking Status Not on file   Smokeless Tobacco Not on file     Social History     Substance and Sexual Activity   Drug Use Not on file       Family History:    No family history on file      Physical Exam:     Vitals:   Blood Pressure: 149/79 (06/11/23 0600)  Pulse: 71 (06/11/23 0630)  Temperature: 97 8 °F (36 6 °C) (06/11/23 0600)  Temp Source: Oral (06/11/23 0600)  Respirations: (!) 26 (06/11/23 0630)  SpO2: (!) 89 % (06/11/23 0630)    Physical Exam  Constitutional:       General: He is not in acute distress  Appearance: Normal appearance  He is not ill-appearing, toxic-appearing or diaphoretic  HENT:      Head: Normocephalic and atraumatic  Nose: No rhinorrhea  Mouth/Throat:      Mouth: Mucous membranes are moist    Eyes:      Comments: Conjunctivitis bilaterally   Cardiovascular:      Rate and Rhythm: Normal rate and regular rhythm  Pulmonary:      Effort: Pulmonary effort is normal       Breath sounds: Rhonchi present  No wheezing  Comments: Minimal rhonchi  Abdominal:      General: Bowel sounds are normal       Palpations: Abdomen is soft  Musculoskeletal:      Right lower leg: No edema  Left lower leg: No edema  Skin:     General: Skin is warm  Neurological:      Mental Status: He is alert  Psychiatric:      Comments: Somnolent        Additional Data:     Lab Results: I have personally reviewed pertinent reports  Results from last 7 days   Lab Units 06/11/23  0036   EOS PCT % 0   HEMATOCRIT % 44 5   HEMOGLOBIN g/dL 14 6   LYMPHS PCT % 20   MONOS PCT % 11   NEUTROS PCT % 69   PLATELETS Thousands/uL 213   WBC Thousand/uL 11 21*     Results from last 7 days   Lab Units 06/11/23  0036   ANION GAP mmol/L 9   ALBUMIN g/dL 4 2   ALK PHOS U/L 162*   ALT U/L 61*   AST U/L 57*   BUN mg/dL 19   CALCIUM mg/dL 9 6   CHLORIDE mmol/L 102   CO2 mmol/L 25   CREATININE mg/dL 1 20   GLUCOSE RANDOM mg/dL 122   POTASSIUM mmol/L 4 3   SODIUM mmol/L 136                       Imaging: I have personally reviewed pertinent reports  CTA chest (pe study) abdomen pelvis routine contrast   Final Result by Veto Flores MD (06/11 0422)      1    No acute pulmonary embolism to the segmental level with evaluation of the subsegmental arterial branches limited by motion artifact  Dilatation of the main pulmonary artery noted suggestive of pulmonary arterial hypertension  2   Mild upper lobe predominant centrilobular emphysema  Nonspecific subtle perihilar infiltrates in the right middle lobe and portions of the right lower lobe noted  Small area of subpleural groundglass opacity in the left upper lobe also noted  Findings could indicate early multifocal infection  Trace right pleural effusion with dependent atelectasis and focal pleural thickening  3   Multiple nonspecific mildly prominent mediastinal and hilar lymph nodes noted with the largest measuring up to 1 9 x 1 2 cm  4   Mild cardiomegaly  5   Calcific atherosclerosis of the aortic arch region, proximal thoracic vessels, and coronary arteries noted  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  6   3 2 cm infrarenal abdominal aortic aneurysm, recommend follow-up imaging every 3 years per current guidelines  7   Cholelithiasis without evidence for acute cholecystitis or significant biliary ductal dilatation  8   Colonic diverticulosis without evidence for acute diverticulitis  No evidence of bowel obstruction, mesenteric inflammation, obstructive uropathy, free air, or free fluid  9   Prominent size of the prostate gland  10   Mesenteric fat-containing renal hernias, right greater than left  Workstation performed: [id number]             EKG, Pathology, and Other Studies Reviewed on Admission:   · cta echo    Allscripts / Epic Records Reviewed: Yes     ** Please Note: This note has been constructed using a voice recognition system   **

## 2023-06-11 NOTE — ED NOTES
Oxygenation levels observed to be hovering between 88-90% although pt supposed to have been on 4 lpm O2 via simple face mask  Assessed pt and found that mask had been disconnected from oxygen source  Mask reconnected and set to 5 lpm   SpO2 found to now be 93%       Viji Wheeler RN  06/11/23 8642

## 2023-06-11 NOTE — ASSESSMENT & PLAN NOTE
· O2 sat 86% on room air  Not maintained on oxygen at home  · Currently satting 89-90% on 4 L  · History of COPD and pulmonary hypertension  Follows with cardiology and pulmonology at [facility]  · CTA: neg for PE    · Mild upper lobe centrilobular emphysema  · Nonspecific subtle perihilar infiltrates in the right middle lobe and portions of the right lower lobe noted  Small area of subpleural groundglass opacity in the left upper lobe also noted; could indicate early multifocal infection  · Trace right pleural effusion with dependent atelectasis and focal pleural thickening  · Check PCT and urine antigens  · Prophylactically treat for CAP with IV ceftriaxone  · Continue home inhalers and COPD prophylactic azithromycin 250 mg daily  · PRN nebs    · Evaluate for home O2    Check ambulatory pulse ox  · Incentive spirometer

## 2023-06-11 NOTE — ASSESSMENT & PLAN NOTE
· CAD s/p CABG  · S/p Bio AVR at the time of CABG  · Follows with cardiology at [facility] AT THE [facility]

## 2023-06-11 NOTE — ASSESSMENT & PLAN NOTE
· Per [facility] cardiology paroxysmal atrial fibrillation detected in 2013 related to hypothyroid  Corrected since

## 2023-06-11 NOTE — PROGRESS NOTES
Patient states that both him and his spouse are still driving  Would like to have help in their home  Realizes both of them are getting older and not in the best oh health anynore  Patient has fall in past 6 months  In [employer]  Has walker and cane at home  Does not use

## 2023-06-11 NOTE — ASSESSMENT & PLAN NOTE
· History of moderate COPD, bronchiectasis and chronic cough   Maintained on Advair BID, Combivent and Azithromycin 250 mg daily  · Followed outpatient by pulmonology at [facility] AT THE [facility]

## 2023-06-11 NOTE — ASSESSMENT & PLAN NOTE
· Minimal elevation in LFTs  · CTA: Cholelithiasis without evidence for acute cholecystitis or significant biliary ductal dilatation  · Check RUQ US  · Trend CMP  · Consider GI consult

## 2023-06-12 LAB
ALBUMIN SERPL BCP-MCNC: 3.6 G/DL (ref 3.5–5)
ALP SERPL-CCNC: 114 U/L (ref 34–104)
ALT SERPL W P-5'-P-CCNC: 42 U/L (ref 7–52)
ANION GAP SERPL CALCULATED.3IONS-SCNC: 7 MMOL/L (ref 4–13)
AST SERPL W P-5'-P-CCNC: 26 U/L (ref 13–39)
BASOPHILS # BLD AUTO: 0.01 THOUSANDS/ÂΜL (ref 0–0.1)
BASOPHILS NFR BLD AUTO: 0 % (ref 0–1)
BILIRUB SERPL-MCNC: 0.52 MG/DL (ref 0.2–1)
BUN SERPL-MCNC: 29 MG/DL (ref 5–25)
CALCIUM SERPL-MCNC: 8.8 MG/DL (ref 8.4–10.2)
CHLORIDE SERPL-SCNC: 107 MMOL/L (ref 96–108)
CO2 SERPL-SCNC: 24 MMOL/L (ref 21–32)
CREAT SERPL-MCNC: 1.14 MG/DL (ref 0.6–1.3)
EOSINOPHIL # BLD AUTO: 0.01 THOUSAND/ÂΜL (ref 0–0.61)
EOSINOPHIL NFR BLD AUTO: 0 % (ref 0–6)
ERYTHROCYTE [DISTWIDTH] IN BLOOD BY AUTOMATED COUNT: 12.8 % (ref 11.6–15.1)
GFR SERPL CREATININE-BSD FRML MDRD: 54 ML/MIN/1.73SQ M
GLUCOSE SERPL-MCNC: 130 MG/DL (ref 65–140)
HCT VFR BLD AUTO: 39.5 % (ref 36.5–49.3)
HGB BLD-MCNC: 13 G/DL (ref 12–17)
IMM GRANULOCYTES # BLD AUTO: 0.07 THOUSAND/UL (ref 0–0.2)
IMM GRANULOCYTES NFR BLD AUTO: 1 % (ref 0–2)
LYMPHOCYTES # BLD AUTO: 1.04 THOUSANDS/ÂΜL (ref 0.6–4.47)
LYMPHOCYTES NFR BLD AUTO: 10 % (ref 14–44)
MCH RBC QN AUTO: 29.7 PG (ref 26.8–34.3)
MCHC RBC AUTO-ENTMCNC: 32.9 G/DL (ref 31.4–37.4)
MCV RBC AUTO: 90 FL (ref 82–98)
MONOCYTES # BLD AUTO: 0.72 THOUSAND/ÂΜL (ref 0.17–1.22)
MONOCYTES NFR BLD AUTO: 7 % (ref 4–12)
NEUTROPHILS # BLD AUTO: 8.44 THOUSANDS/ÂΜL (ref 1.85–7.62)
NEUTS SEG NFR BLD AUTO: 82 % (ref 43–75)
NRBC BLD AUTO-RTO: 0 /100 WBCS
PLATELET # BLD AUTO: 213 THOUSANDS/UL (ref 149–390)
PMV BLD AUTO: 10 FL (ref 8.9–12.7)
POTASSIUM SERPL-SCNC: 4.1 MMOL/L (ref 3.5–5.3)
PROCALCITONIN SERPL-MCNC: 0.15 NG/ML
PROT SERPL-MCNC: 6.3 G/DL (ref 6.4–8.4)
RBC # BLD AUTO: 4.37 MILLION/UL (ref 3.88–5.62)
SODIUM SERPL-SCNC: 138 MMOL/L (ref 135–147)
WBC # BLD AUTO: 10.29 THOUSAND/UL (ref 4.31–10.16)

## 2023-06-12 PROCEDURE — 99232 SBSQ HOSP IP/OBS MODERATE 35: CPT | Performed by: PHYSICIAN ASSISTANT

## 2023-06-12 PROCEDURE — 85025 COMPLETE CBC W/AUTO DIFF WBC: CPT | Performed by: NURSE PRACTITIONER

## 2023-06-12 PROCEDURE — 80053 COMPREHEN METABOLIC PANEL: CPT | Performed by: NURSE PRACTITIONER

## 2023-06-12 PROCEDURE — 97167 OT EVAL HIGH COMPLEX 60 MIN: CPT

## 2023-06-12 PROCEDURE — 84145 PROCALCITONIN (PCT): CPT | Performed by: INTERNAL MEDICINE

## 2023-06-12 RX ADMIN — GLYCERIN, HYPROMELLOSE, POLYETHYLENE GLYCOL 1 DROP: .2; .2; 1 LIQUID OPHTHALMIC at 13:23

## 2023-06-12 RX ADMIN — FINASTERIDE 5 MG: 5 TABLET, FILM COATED ORAL at 09:50

## 2023-06-12 RX ADMIN — ALBUTEROL SULFATE 2 PUFF: 90 AEROSOL, METERED RESPIRATORY (INHALATION) at 13:23

## 2023-06-12 RX ADMIN — WHITE PETROLATUM 57.7 %-MINERAL OIL 31.9 % EYE OINTMENT 1 APPLICATION.: at 21:02

## 2023-06-12 RX ADMIN — HEPARIN SODIUM 5000 UNITS: 5000 INJECTION INTRAVENOUS; SUBCUTANEOUS at 13:23

## 2023-06-12 RX ADMIN — GLYCERIN, HYPROMELLOSE, POLYETHYLENE GLYCOL 1 DROP: .2; .2; 1 LIQUID OPHTHALMIC at 17:34

## 2023-06-12 RX ADMIN — AZITHROMYCIN MONOHYDRATE 500 MG: 250 TABLET ORAL at 09:50

## 2023-06-12 RX ADMIN — PRAVASTATIN SODIUM 40 MG: 40 TABLET ORAL at 17:34

## 2023-06-12 RX ADMIN — HEPARIN SODIUM 5000 UNITS: 5000 INJECTION INTRAVENOUS; SUBCUTANEOUS at 21:00

## 2023-06-12 RX ADMIN — FUROSEMIDE 20 MG: 20 TABLET ORAL at 09:50

## 2023-06-12 RX ADMIN — ALBUTEROL SULFATE 2 PUFF: 90 AEROSOL, METERED RESPIRATORY (INHALATION) at 22:00

## 2023-06-12 RX ADMIN — AMLODIPINE BESYLATE 5 MG: 5 TABLET ORAL at 09:50

## 2023-06-12 RX ADMIN — GUAIFENESIN 600 MG: 600 TABLET, EXTENDED RELEASE ORAL at 09:50

## 2023-06-12 RX ADMIN — HEPARIN SODIUM 5000 UNITS: 5000 INJECTION INTRAVENOUS; SUBCUTANEOUS at 05:49

## 2023-06-12 RX ADMIN — ALBUTEROL SULFATE 2 PUFF: 90 AEROSOL, METERED RESPIRATORY (INHALATION) at 17:34

## 2023-06-12 RX ADMIN — GLYCERIN, HYPROMELLOSE, POLYETHYLENE GLYCOL 1 DROP: .2; .2; 1 LIQUID OPHTHALMIC at 21:01

## 2023-06-12 RX ADMIN — GLYCERIN, HYPROMELLOSE, POLYETHYLENE GLYCOL 1 DROP: .2; .2; 1 LIQUID OPHTHALMIC at 09:50

## 2023-06-12 RX ADMIN — CEFTRIAXONE 1000 MG: 1 INJECTION, SOLUTION INTRAVENOUS at 05:50

## 2023-06-12 RX ADMIN — PANTOPRAZOLE SODIUM 20 MG: 20 TABLET, DELAYED RELEASE ORAL at 05:49

## 2023-06-12 RX ADMIN — ALBUTEROL SULFATE 2 PUFF: 90 AEROSOL, METERED RESPIRATORY (INHALATION) at 09:50

## 2023-06-12 RX ADMIN — LEVOTHYROXINE SODIUM 75 MCG: 75 TABLET ORAL at 05:49

## 2023-06-12 RX ADMIN — FLUTICASONE FUROATE AND VILANTEROL TRIFENATATE 1 PUFF: 200; 25 POWDER RESPIRATORY (INHALATION) at 09:50

## 2023-06-12 RX ADMIN — LOSARTAN POTASSIUM 25 MG: 25 TABLET, FILM COATED ORAL at 09:50

## 2023-06-12 RX ADMIN — GUAIFENESIN 600 MG: 600 TABLET, EXTENDED RELEASE ORAL at 17:34

## 2023-06-12 NOTE — OCCUPATIONAL THERAPY NOTE
Occupational Therapy Evaluation     Patient Name: Jose Leslie  AQTSS'Q Date: 6/12/2023  Problem List  Principal Problem:    Chronic respiratory failure with hypoxia (HCC)  Active Problems:    Elevated troponin    Elevated LFTs    Moderate COPD (chronic obstructive pulmonary disease) (HCC)    Pulmonary hypertension (HCC)    History of atrial fibrillation    Coronary artery disease involving native coronary artery of native heart with angina pectoris ([facility] [address] )    Abdominal aortic aneurysm (AAA) without rupture ([facility] [address] )    Community acquired pneumonia    Past Medical History  History reviewed  No pertinent past medical history  Past Surgical History  History reviewed  No pertinent surgical history  06/12/23 1032   OT Last Visit   OT Visit Date 06/12/23   Note Type   Note type Evaluation   Pain Assessment   Pain Assessment Tool 0-10   Pain Score No Pain   Restrictions/Precautions   Weight Bearing Precautions Per Order No   Other Precautions Chair Alarm; Bed Alarm;O2;Hard of hearing  (pt on 2L of O2 but no O2 requirements at home)   Home Living   Type of [address] [place] [address] One level; Laundry in basement;Performs ADLs on one level;Stairs to enter with rails  (7 KENNY in front, 2 KENNY in back w railing)   Bathroom Shower/Tub Tub/shower unit  (sponge baths in between showers)   Bathroom Toilet Raised   Bathroom Equipment Grab bars in shower; Shower chair;Grab bars around toilet   [address]   (no AD)   Additional Comments pt states he does not use any AD   Prior Function   Level of [place] Independent with ADLs; Independent with functional mobility; Independent with IADLS  (wife drives, but he does the wash & dishes, wife cooks)   Lives With Joanne Help From   (no family is in the area, son is not in good health)   IADLs Family/Friend/Other provides transportation; Independent with medication management; Family/Friend/Other provides meals   Falls in the last 6 months 0   Vocational Retired   Comments pt takes care of wife   Lifestyle   Autonomy pta pt was (I) adl and iadls, (-) , o falls   Reciprocal Relationships wife   Service to Others retired   Intrinsic Gratification his wife and his home   General   Additional Pertinent History pt diagnosed w/ COPD, pulmonary hypertension, hx of a-fib, aortic aneurysm w/o rupture, pneumonia  Family/Caregiver Present No   Additional General Comments pt is hard of hearing   Subjective   Subjective hard of hearing   ADL   Where Assessed Edge of bed   Eating Assistance 6  Modified independent   Grooming Assistance 5  Supervision/Setup   Grooming Deficit Increased time to complete;Steadying   UB Bathing Assistance 4  Minimal Assistance   LB Bathing Assistance 3  Moderate Assistance   UB Dressing Assistance 5  Supervision/Setup    [address] 4  Minimal Assistance   LB Dressing Deficit Thread RLE into pants; Thread LLE into pants;Pull up over hips   Toileting Assistance  5  Supervision/Setup   Toileting Deficit Use of bedpan/urinal setup   Bed Mobility   Supine to Sit   (pt sitting EOB upon arrival)   Transfers   Sit to Stand 4  Minimal assistance   Additional items Assist x 1; Armrests; Increased time required;Verbal cues; Impulsive  (pt educated on handplacement for use of RW)   Stand to Sit 4  Minimal assistance   Additional items Assist x 1; Increased time required;Verbal cues   Functional Mobility   Functional Mobility 4  Minimal assistance   Additional Comments pt benefits from VCs w/ RW management and min a x1   Additional items Rolling walker   Balance   Static Sitting Fair   Dynamic Sitting Fair -   Static Standing Fair   Dynamic Standing Fair -   Ambulatory Fair -   Activity Tolerance   Activity Tolerance Patient tolerated treatment well  (pt had no c/o SOB, pt tolerated therapy well )   Nurse Made Aware yes, Sukhwinder RN   RUE Assessment   RUE Assessment WFL   LUE Assessment   LUE Assessment WFL   Vision-Basic Assessment   Current Vision Wears glasses all the time   Vision - Complex Assessment   Acuity Able to read employee name badge without difficulty; Able to read clock/calendar on wall without difficulty   Psychosocial   Psychosocial (WDL) WDL   Cognition   Overall Cognitive Status Impaired   Arousal/Participation Alert; Responsive   Attention Attends with cues to redirect   Orientation Level Oriented X4   Memory Decreased short term memory   Following Commands Follows one step commands with increased time or repetition   Assessment   Limitation Decreased ADL status; Decreased Safe judgement during ADL;Decreased endurance;Decreased cognition;Decreased high-level ADLs; Decreased self-care trans  (impaired balance, fxnl mobility, act wilber and standing wilber, safety awareness and insight, response time)   Prognosis Good   Assessment Pt is a 80 y o  male seen for OT evaluation s/p admission to [facility] on 6/10/2023 due to extended periods of coughing, elevated troponin, elevated LFTs, COPD, pulmonary hypertension, hx of a-fib, aortic aneurysm w/o rupture, pneumonia  Diagnosed with Chronic respiratory failure with hypoxia ([facility] [address] )  Personal and env factors supporting pt at time of IE include attitude towards recovery and accessible home environment  Personal and env factors inhibiting engagement in occupations include advanced age, 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, difficulty completing ADLs and difficulty completing IADLs  Performance deficits that affect the pt’s occupational performance can be seen above  Due to pt's current functional limitations and medical complications pt is functioning below baseline  Pt would benefit from continued skilled OT treatment in order to maximize safety, independence and overall performance with ADLs, functional transfers, functional mobility and cognition in order to achieve highest level of function     Goals   Patient Goals to go home to his wife   LTG Time Frame 10-14   Long Term Goal see below Plan   Treatment Interventions ADL retraining;Functional transfer training; Endurance training;Patient/family training;Equipment evaluation/education; Compensatory technique education; Energy conservation; Activityengagement;Cardiac education   Goal Expiration Date 06/26/23   OT Treatment Day 0   OT Frequency 3-5x/wk   Recommendation   OT Discharge Recommendation Home with home health rehabilitation   Additional Comments  pt would like to return home to wife   Additional Comments 2 The patient's raw score on the AM-PAC Daily Activity Inpatient Short Form is 18  A raw score of less than 19 suggests the patient may benefit from discharge to post-acute rehabilitation services  Please refer to the recommendation of the Occupational Therapist for safe discharge planning     AM-PAC Daily Activity Inpatient   Lower Body Dressing 3   Bathing 2   Toileting 3   Upper Body Dressing 3   Grooming 3   Eating 4   Daily Activity Raw Score 18   Daily Activity Standardized Score (Calc for Raw Score >=11) 38 66   [facility]   Following a Speech/Presentation 2   Understanding Ordinary Conversation 3   Taking Medications 3   Remembering Where Things Are Placed or Put Away 3   Remembering List of 4-5 Errands 2   Taking Care of Complicated Tasks 2   Applied Cognition Raw Score 15   Applied Cognition Standardized Score 33 54   End of Consult   Patient Position at End of Consult Bed/Chair alarm activated   Nurse Communication Nurse aware of consult      GOALS    Pt will improve activity tolerance to G for min 30 min txment sessions for increase engagement in functional tasks    Pt will complete UB dressing/self care w/ mod I using adaptive device and DME as needed     Pt will complete LB dressing/self care w/ mod I using adaptive device and DME as needed    Pt will complete toileting w/ mod I w/ G hygiene/thoroughness using DME as needed    Pt will improve functional transfers to Mod I on/off all surfaces using DME as needed w/ G balance/safety     Pt will improve functional mobility during ADL/IADL/leisure tasks to Mod I using DME as needed w/ G balance/safety     Pt will demonstrate G carryover of pt/caregiver education and training as appropriate w/o cues w/ good tolerance to increase safety during functional tasks    Pt will demonstrate 100% carryover of energy conservation techniques t/o functional I/ADL/leisure tasks w/o cues s/p skilled education to increase endurance during functional tasks    Pt will participate in simulated IADL management task to increase independence to Mod I w/ G safety and endurance    Pt will verbalize 3 potential fall hazards and identify appropriate compensatory techniques to decrease fall risk in home environment     to 5  mins with fair +  dynamic standing balance to increase safety during participation in ADLs     The patient's raw score on the AM-PAC Daily Activity Inpatient Short Form is 18  A raw score of less than 19 suggests the patient may benefit from discharge to post-acute rehabilitation services  Please refer to the recommendation of the Occupational Therapist for safe discharge planning      Alisha Flores OTR/L

## 2023-06-12 NOTE — CASE MANAGEMENT
Case Management Assessment & Discharge Planning Note    Patient name Constantin Barney  Location [address] [place] [address] Luite Bairon 87 446/E4 MS 46-* MRN [id number]  : 1929 Date 2023       Current Admission Date: 6/10/2023  Current Admission Diagnosis:Chronic respiratory failure with hypoxia [facility])   Patient Active Problem List    Diagnosis Date Noted   • Elevated troponin 2023   • Elevated LFTs 2023   • Moderate COPD (chronic obstructive pulmonary disease) ([facility] ) 2023   • Pulmonary hypertension ([facility] ) 2023   • Chronic respiratory failure with hypoxia ([facility] ) 2023   • History of atrial fibrillation 2023   • Coronary artery disease involving native coronary artery of native heart with angina pectoris ([facility] ) 2023   • Abdominal aortic aneurysm (AAA) without rupture ([facility] ) 2023   • Community acquired pneumonia 2023      LOS (days): 1  Geometric Mean LOS (GMLOS) (days):   Days to GMLOS:     OBJECTIVE:    Risk of Unplanned Readmission Score: 13 01         Current admission status: Inpatient       Preferred Pharmacy:   [facility] TO E-PRESCRIBE  No address on file      Primary Care Provider: No primary care provider on file  Primary Insurance: [facility]  Secondary Insurance:     ASSESSMENT:  [address] Proxies    There are no active Health Care Proxies on file         Advance Directives  Does patient have a [address]?: Yes  Does patient have Advance Directives?: Yes  Advance Directives: Living will, Power of  for health care, Power of  for finance  Primary Contact: Zina Velez [phone]    Readmission Root Cause  30 Day Readmission: No    Patient Information  Admitted from[de-identified] Home  Mental Status: Alert  During Assessment patient was accompanied by: Not accompanied during assessment  Assessment information provided by[de-identified] Patient  Primary Caregiver: Self  Support Systems: Spouse/significant other, Family members  [place] of Residence: [address] [place] [address] do you live in?: [employer] entry access options  Select all that apply : Stairs  Number of steps to enter home : 2  Type of Current Residence: [facility]  Living Arrangements: Lives w/ Spouse/significant other  Is patient a ?: Yes  Is patient active with [facility])?: No    Activities of Daily Living Prior to Admission  Functional Status: Independent  Completes ADLs independently?: Yes  Ambulates independently?: Yes  Does patient use assisted devices?: No  Does patient currently own DME?: Yes  What DME does the patient currently own?: [employer]  Does patient have a history of Outpatient Therapy (PT/OT)?: Yes  Does the patient have a history of Short-Term Rehab?: No  Does patient have a history of HHC?: No  Does patient currently have [employer]?: No    Patient Information Continued  Income Source: Pension/[employer]  Does patient have prescription coverage?: Yes  Does patient have a history of substance abuse?: No  Does patient have a history of Mental Health Diagnosis?: No    Means of Transportation  Means of Transport to Appts[de-identified] Family transport        DISCHARGE DETAILS:    Discharge planning discussed with[de-identified] patient's spouse Benito Robles     Contacts  Patient Contacts: Benito Robles  Relationship to Patient[de-identified] Family  Contact Method: Phone  Phone Number: [phone]  Reason/Outcome: Continuity of Care, Discharge Planning, Emergency Contact    Treatment Team Recommendation: Home  Discharge Destination Plan[de-identified] Home  Transport at Discharge : Family     ETA of Transport (Date): 06/13/23     Additional Comments: Patient lives w/ spouse in a ranch home [address]  Spouse states there is a roller walker in the home but she is ordering a rollator, states she did not need CM to order one  Patient has a ride home w/ family when clear for discharge  Spouse wants a call when the discharge order is written, she will need a wheel chair to get to his room

## 2023-06-12 NOTE — UTILIZATION REVIEW
Date: 6/13   Day 3: Has surpassed a 2nd midnight with active treatments and services  Respiratory failure and PNA on IV antibiotics, oxygen  Initial Clinical Review    Admission: Date/Time/Statement:   Admission Orders (From admission, onward)     Ordered        06/11/23 0609  INPATIENT ADMISSION  Once                      Orders Placed This Encounter   Procedures   • INPATIENT ADMISSION     Standing Status:   Standing     Number of Occurrences:   1     Order Specific Question:   Level of Care     Answer:   Med Surg [16]     Order Specific Question:   Estimated length of stay     Answer:   More than 2 Midnights     Order Specific Question:   Certification     Answer:   I certify that inpatient services are medically necessary for this patient for a duration of greater than two midnights  See H&P and MD Progress Notes for additional information about the patient's course of treatment  ED Arrival Information     Expected   -    Arrival   6/10/2023 23:11    Acuity   Emergent            Means of arrival   Ambulance    Escorted by   [place] ([facility])    Service   Hospitalist    Admission type   Emergency            Arrival complaint   SOB           Chief Complaint   Patient presents with   • Shortness of Breath     Patient arrives via ems coming from home c/o of sob worsening  tonight, patient received 125 solumederol, and duo neb pta       Initial Presentation: 80 y o  male presents to the ED via EMS from home with c/o dyspnea, worsening SOB overnight, fevers, diaphoresis, productive coughing, is on home oxyge  EMS treated with IV SoluMedrol and DuoNeb  PMH: COOPD, Pulm HTN, A fib, CAD  In the ED he was tachypnic  Labs - leukocytosis, elevated D dimer, LFTs, troponins    Imaging - no PE, + pulm arterial HTN, emphysema, early multifocal infection, trace R pleural effusion, mediastinal and hilar lymph nodes, mild cardiomegaly, cholelithiasis, no acute cholecystitis, no bowel obstruction, enlarged prostate, renal hernias R>L  ECG  - SR w/ 1st degress AV block, RBBB  Treated with ASA, IV fluids, IV antibiotics  On exam mild rhonchi, bilat conjunctivitis, somnolent  He is admitted to INPATIENT status with chronic hypoxic resp failure - 86% sat on RA, on oxygen 4L NC, home inhalers, PRN nebs, Incentive spirometry  CAP - antibiotics both IV and PO for COPD  Elevated LFTs - US RUQ, poss GI Consult  Elevated troponin - tele, trend troponins, ECGs  Date: 6/12   Day 2:   Oxygen down to 2L NC  Less tachypnea  WBC trending down  Continues on IV antibiotics  Tolerating diet, feels improvement, course breath sounds bilat        ED Triage Vitals   Temperature Pulse Respirations Blood Pressure SpO2   06/10/23 2315 06/10/23 2315 06/10/23 2315 06/10/23 2315 06/10/23 2315   99 8 °F (37 7 °C) 88 (!) 26 132/66 94 %      Temp Source Heart Rate Source Patient Position - Orthostatic VS BP Location FiO2 (%)   06/11/23 0600 06/10/23 2315 06/11/23 0042 06/11/23 0042 --   Oral Monitor Lying Right arm       Pain Score       06/11/23 0647       No Pain          Wt Readings from Last 1 Encounters:   06/11/23 85 9 kg (189 lb 6 oz)     Additional Vital Signs:   06/13/23 0731 98 4 °F (36 9 °C) 68 18 148/76 -- 98 % -- -- -- Nasal cannula Lying   06/13/23 0730 -- -- -- -- -- -- 28 -- 2 L/min Nasal cannula --   06/13/23 0000 98 7 °F (37 1 °C) 67 18 145/84 109 92 % -- -- -- -- Lying   06/12/23 2100 -- -- -- -- -- -- 28 -- 2 L/min Nasal cannula --   06/12/23 1500 98 °F (36 7 °C) 65 18 144/66 95 92 % -- -- -- Nasal cannula Sitting     06/12/23 0949 -- 70 -- 154/70 -- -- -- -- -- -- Sitting   06/12/23 0708 97 6 °F (36 4 °C) 58 20 149/91 115 92 % 28 -- 2 L/min Nasal cannula Lying   06/12/23 0311 97 5 °F (36 4 °C) 65 18 136/80 85 94 % -- -- -- None (Room air) Lying   06/12/23 0014 97 1 °F (36 2 °C) Abnormal  84 22 159/90 110 91 % 28 -- 2 L/min Nasal cannula Lying   06/11/23 1950 98 2 °F (36 8 °C) 71 -- 138/65 94 93 % 28 -- 2 L/min Nasal cannula Lying   06/11/23 1507 97 7 °F (36 5 °C) 70 26 Abnormal  127/68 91 94 % -- -- -- Nasal cannula Lying   06/11/23 1320 -- -- -- -- -- -- -- 2 L/min -- Nasal cannula --   06/11/23 1107 97 4 °F (36 3 °C) Abnormal  79 26 Abnormal  110/62 80 92 % -- -- -- Nasal cannula Sitting   06/11/23 0832 97 6 °F (36 4 °C) 79 26 Abnormal  143/67 97 94 % -- -- -- Simple mask Lying   06/11/23 0647 -- 80 26 Abnormal  165/83 -- 93 % -- 4 L/min -- Simple mask Sitting   06/11/23 0630 -- 71 26 Abnormal  -- -- 89 % Abnormal  -- -- -- None (Room air) --   06/11/23 0612 -- -- -- -- -- 86 % Abnormal  -- -- -- -- --   06/11/23 0600 97 8 °F (36 6 °C) 75 26 Abnormal  149/79 108 91 % -- 4 L/min -- Simple mask Lying   06/11/23 0500 -- 75 24 Abnormal  139/66 93 93 % -- 4 L/min -- Simple mask Lying   06/11/23 0400 -- 75 24 Abnormal  170/76 109 96 % -- 4 L/min -- Simple mask Lying   06/11/23 0315 -- 77 -- 160/72 104 95 % -- 4 L/min -- Simple mask Lying   06/11/23 0230 -- 77 31 Abnormal  156/80 112 94 % -- 4 L/min -- Simple mask Lying   06/11/23 0145 -- 76 28 Abnormal  135/65 92 94 % -- 3 L/min -- Simple mask Lying   06/11/23 0042 -- 75 18 132/65 -- 91 % -- -- -- None (Room air) Lying     Pertinent Labs/Diagnostic Test Results:     6/11 ECG - Sinus rhythm with 1st degree A-V block  Right bundle branch block  T wave abnormality, consider inferolateral ischemia  Abnormal ECG    6/12 US RUQ - Cholelithiasis without sonographic evidence of acute cholecystitis  The gallbladder is contracted, limiting evaluation  The common bile duct measures 8 mm in caliber which is at the upper limits of normal for a patient of this age  US right upper quadrant   Final Result by Lavelle Rico DO (06/12 0225)      1  Cholelithiasis without sonographic evidence of acute cholecystitis  The gallbladder is contracted, limiting evaluation  The common bile duct measures 8 mm in caliber which is at the upper limits of normal for a patient of this age  Workstation performed: [id number]         CTA chest (pe study) abdomen pelvis routine contrast   Final Result by Katie Hough MD (06/11 0422)      1  No acute pulmonary embolism to the segmental level with evaluation of the subsegmental arterial branches limited by motion artifact  Dilatation of the main pulmonary artery noted suggestive of pulmonary arterial hypertension  2   Mild upper lobe predominant centrilobular emphysema  Nonspecific subtle perihilar infiltrates in the right middle lobe and portions of the right lower lobe noted  Small area of subpleural groundglass opacity in the left upper lobe also noted  Findings could indicate early multifocal infection  Trace right pleural effusion with dependent atelectasis and focal pleural thickening  3   Multiple nonspecific mildly prominent mediastinal and hilar lymph nodes noted with the largest measuring up to 1 9 x 1 2 cm  4   Mild cardiomegaly  5   Calcific atherosclerosis of the aortic arch region, proximal thoracic vessels, and coronary arteries noted  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  6   3 2 cm infrarenal abdominal aortic aneurysm, recommend follow-up imaging every 3 years per current guidelines  7   Cholelithiasis without evidence for acute cholecystitis or significant biliary ductal dilatation  8   Colonic diverticulosis without evidence for acute diverticulitis  No evidence of bowel obstruction, mesenteric inflammation, obstructive uropathy, free air, or free fluid  9   Prominent size of the prostate gland  10   Mesenteric fat-containing renal hernias, right greater than left        Workstation performed: [id number]           Results from last 7 days   Lab Units 06/11/23  0033   SARS-COV-2  Negative     Results from last 7 days   Lab Units 06/13/23  0551 06/12/23  0511 06/11/23  0036   HEMATOCRIT % 41 5 39 5 44 5   HEMOGLOBIN g/dL 13 5 13 0 14 6   NEUTROS ABS Thousands/µL  --  8 44* 7 66*   PLATELETS Thousands/uL 231 213 213   WBC Thousand/uL 7 88 10 29* 11 21*         Results from last 7 days   Lab Units 06/13/23  0551 06/12/23  0511 06/11/23  0036   ANION GAP mmol/L 6 7 9   BUN mg/dL 27* 29* 19   CALCIUM mg/dL 8 4 8 8 9 6   CHLORIDE mmol/L 107 107 102   CO2 mmol/L 25 24 25   CREATININE mg/dL 1 03 1 14 1 20   EGFR ml/min/1 73sq m 61 54 51   POTASSIUM mmol/L 3 8 4 1 4 3   SODIUM mmol/L 138 138 136     Results from last 7 days   Lab Units 06/12/23  0511 06/11/23  0036   ALBUMIN g/dL 3 6 4 2   ALK PHOS U/L 114* 162*   ALT U/L 42 61*   AST U/L 26 57*   TOTAL BILIRUBIN mg/dL 0 52  --    TOTAL PROTEIN g/dL 6 3* 7 3         Results from last 7 days   Lab Units 06/13/23  0551 06/12/23  0511 06/11/23  0036   GLUCOSE RANDOM mg/dL 99 130 122     Results from last 7 days   Lab Units 06/11/23  0457 06/11/23  0254 06/11/23  0036   HS TNI 0HR ng/L  --   --  50*   HS TNI 2HR ng/L  --  52*  --    HS TNI 4HR ng/L 49  --   --    HSTNI D2 ng/L  --  2  --    HSTNI D4 ng/L -1  --   --      Results from last 7 days   Lab Units 06/11/23  0036   D-DIMER QUANTITATIVE ug/ml FEU 4 42*             Results from last 7 days   Lab Units 06/12/23  0511 06/11/23  0036   PROCALCITONIN ng/ml 0 15 0 18         Results from last 7 days   Lab Units 06/11/23  1415 06/11/23  0033   INFLUENZA A PCR   --  Negative   INFLUENZA B PCR   --  Negative   LEGIONELLA URINARY ANTIGEN  Negative  --    RSV PCR   --  Negative   STREP PNEUMONIAE ANTIGEN, URINE  Negative  --      ED Treatment:   Medication Administration from 06/10/2023 2310 to 06/11/2023 0813       Date/Time Order Dose Route Action     06/11/2023 0249 EDT aspirin tablet 325 mg 325 mg Oral Given     06/11/2023 0315 EDT sodium chloride 0 9 % bolus 500 mL 500 mL Intravenous New Bag     06/11/2023 0319 EDT iohexol (OMNIPAQUE) 350 MG/ML injection (SINGLE-DOSE) 100 mL 100 mL Intravenous Given     06/11/2023 0616 EDT cefTRIAXone (ROCEPHIN) IVPB (premix in dextrose) 1,000 mg 50 mL 1,000 mg Intravenous New Bag        Present on Admission:  • Elevated troponin  • Elevated LFTs  • Moderate COPD (chronic obstructive pulmonary disease) (HCC)  • Pulmonary hypertension (HCC)  • Chronic respiratory failure with hypoxia (HCC)  • Coronary artery disease involving native coronary artery of native heart with angina pectoris [facility])  • Abdominal aortic aneurysm (AAA) without rupture (HCC)  • Community acquired pneumonia      Admitting Diagnosis: Shortness of breath [R06 02]  Cough [R05 9]  Pneumonia [J18 9]  SOB (shortness of breath) [R06 02]  Hypoxia [R09 02]  Elevated troponin level [R77 8]  On supplemental oxygen therapy [Z99 81]  Age/Sex: 80 y o  male  Admission Orders:  Scheduled Medications:  umeclidinium, 1 puff, Inhalation, Daily   And  albuterol, 2 puff, Inhalation, 4x Daily  amLODIPine, 5 mg, Oral, Daily  artificial tear, , Both Eyes, HS  azithromycin, 500 mg, Oral, Daily  cefTRIAXone, 1,000 mg, Intravenous, Q24H  finasteride, 5 mg, Oral, Daily  fluticasone-vilanterol, 1 puff, Inhalation, Daily  furosemide, 20 mg, Oral, Daily  glycerin-hypromellose-, 1 drop, Both Eyes, 4x Daily  guaiFENesin, 600 mg, Oral, BID  heparin (porcine), 5,000 Units, Subcutaneous, Q8H LULÚ  levothyroxine, 75 mcg, Oral, Early Morning  losartan, 25 mg, Oral, Daily  pantoprazole, 20 mg, Oral, Early Morning  pravastatin, 40 mg, Oral, Daily With Dinner      Continuous IV Infusions:     PRN Meds:  acetaminophen, 650 mg, Oral, Q6H PRN  aluminum-magnesium hydroxide-simethicone, 30 mL, Oral, Q6H PRN  dextromethorphan-guaiFENesin, 10 mL, Oral, Q4H PRN  levalbuterol, 0 63 mg, Nebulization, 4x Daily PRN  nitroglycerin, 0 4 mg, Sublingual, Q5 Min PRN  ondansetron, 4 mg, Intravenous, Q6H PRN  simethicone, 80 mg, Oral, 4x Daily PRN    Ambulatory pulse ox  Incentive spirometry  OOB       Network Utilization Review Department  ATTENTION: Please call with any questions or concerns to [phone] and carefully listen to the prompts so that you are directed to the right person   All voicemails are confidential   Chloe Paz all requests for admission clinical reviews, approved or denied determinations and any other requests to dedicated fax number below belonging to the campus where the patient is receiving treatment   List of dedicated fax numbers for the Facilities:  [address] DENIALS (Administrative/Medical Necessity) [phone]   [address] (Maternity/NICU/Pediatrics) [phone]   917 Sarah Cummins [phone]   [place] [address] [phone]   [id number] [place] [address] [phone]   1553 [facility] 134 815 [facility] [phone]

## 2023-06-12 NOTE — PLAN OF CARE
Problem: PAIN - ADULT  Goal: Verbalizes/displays adequate comfort level or baseline comfort level  Description: Interventions:  - Encourage patient to monitor pain and request assistance  - Assess pain using appropriate pain scale  - Administer analgesics based on type and severity of pain and evaluate response  - Implement non-pharmacological measures as appropriate and evaluate response  - Consider cultural and social influences on pain and pain management  - Notify physician/advanced practitioner if interventions unsuccessful or patient reports new pain  Outcome: Progressing     Problem: INFECTION - ADULT  Goal: Absence or prevention of progression during hospitalization  Description: INTERVENTIONS:  - Assess and monitor for signs and symptoms of infection  - Monitor lab/diagnostic results  - Monitor all insertion sites, i e  indwelling lines, tubes, and drains  - Monitor endotracheal if appropriate and nasal secretions for changes in amount and color  - [place] appropriate cooling/warming therapies per order  - Administer medications as ordered  - Instruct and encourage patient and family to use good hand hygiene technique  - Identify and instruct in appropriate isolation precautions for identified infection/condition  Outcome: Progressing     Problem: SAFETY ADULT  Goal: Patient will remain free of falls  Description: INTERVENTIONS:  - Educate patient/family on patient safety including physical limitations  - Instruct patient to call for assistance with activity   - Consult OT/PT to assist with strengthening/mobility   - Keep Call bell within reach  - Keep bed low and locked with side rails adjusted as appropriate  - Keep care items and personal belongings within reach  - Initiate and maintain comfort rounds  - Make Fall Risk Sign visible to staff  - Apply yellow socks and bracelet for high fall risk patients  - Consider moving patient to room near nurses station  Outcome: Progressing     Problem: DISCHARGE PLANNING  Goal: Discharge to home or other facility with appropriate resources  Description: INTERVENTIONS:  - Identify barriers to discharge w/patient and caregiver  - Arrange for needed discharge resources and transportation as appropriate  - Identify discharge learning needs (meds, wound care, etc )  - Arrange for interpretive services to assist at discharge as needed  - Refer to Case Management Department for coordinating discharge planning if the patient needs post-hospital services based on physician/advanced practitioner order or complex needs related to functional status, cognitive ability, or social support system  Outcome: Progressing     Problem: Knowledge Deficit  Goal: Patient/family/caregiver demonstrates understanding of disease process, treatment plan, medications, and discharge instructions  Description: Complete learning assessment and assess knowledge base    Interventions:  - Provide teaching at level of understanding  - Provide teaching via preferred learning methods  Outcome: Progressing

## 2023-06-12 NOTE — ASSESSMENT & PLAN NOTE
Present on admission O2 sat 86% on room air  Not maintained on oxygen at home, initially requiring 4 L nasal cannula oxygen  · Now requiring 2 L nasal cannula oxygen to maintain oxygen saturations greater than 88%  · History of COPD and pulmonary hypertension  Follows with cardiology and pulmonology at [facility]  · CTA: neg for PE    · Mild upper lobe centrilobular emphysema  · Nonspecific subtle perihilar infiltrates in the right middle lobe and portions of the right lower lobe noted    Small area of subpleural groundglass opacity in the left upper lobe also noted; could indicate early multifocal infection  · Trace right pleural effusion with dependent atelectasis and focal pleural thickening  · Procalcitonin negative  · Legionella and strep pneumo urine antigens negative  · We will continue IV ceftriaxone given patient's symptoms and radiographic findings  · Continue home inhalers and COPD prophylactic azithromycin 250 mg daily  · Incentive spirometry  · Respiratory protocol

## 2023-06-12 NOTE — ASSESSMENT & PLAN NOTE
Known hx of pulmonary arterial hypertension  · CTA PE:  Dilatation of the main pulmonary artery noted suggestive of pulmonary arterial hypertension

## 2023-06-12 NOTE — PLAN OF CARE
Problem: OCCUPATIONAL THERAPY ADULT  Goal: Performs self-care activities at highest level of function for planned discharge setting  See evaluation for individualized goals  Description: Treatment Interventions: ADL retraining, Functional transfer training, Endurance training, Patient/family training, Equipment evaluation/education, Compensatory technique education, Energy conservation, Activityengagement, Cardiac education          See flowsheet documentation for full assessment, interventions and recommendations  6/12/2023 1349 by Kaveh Dale OT  Note: Limitation: Decreased ADL status, Decreased Safe judgement during ADL, Decreased endurance, Decreased cognition, Decreased high-level ADLs, Decreased self-care trans (impaired balance, fxnl mobility, act wilber and standing wilber, safety awareness and insight, response time)  Prognosis: Good  Assessment: Pt is a 80 y o  male seen for OT evaluation s/p admission to THE HOSPITAL AT [facility] on 6/10/2023 due to extended periods of coughing, elevated troponin, elevated LFTs, COPD, pulmonary hypertension, hx of a-fib, aortic aneurysm w/o rupture, pneumonia  Diagnosed with Chronic respiratory failure with hypoxia ([facility] [address] )  Personal and env factors supporting pt at time of IE include attitude towards recovery and accessible home environment  Personal and env factors inhibiting engagement in occupations include advanced age, 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       6/12/2023 1349 by Jie Cooley OT  Note: Limitation: Decreased ADL status, Decreased Safe judgement during ADL, Decreased endurance, Decreased cognition, Decreased high-level ADLs, Decreased self-care trans (impaired balance, fxnl mobility, act wilber and standing wilber, safety awareness and insight, response time)  Prognosis: Good  Assessment: Pt is a 80 y o  male seen for OT evaluation s/p admission to THE [facility] AT [facility] on 6/10/2023 due to extended periods of coughing, elevated troponin, elevated LFTs, COPD, pulmonary hypertension, hx of a-fib, aortic aneurysm w/o rupture, pneumonia  Diagnosed with Chronic respiratory failure with hypoxia ([facility] [address] )  Personal and env factors supporting pt at time of IE include attitude towards recovery and accessible home environment  Personal and env factors inhibiting engagement in occupations include advanced age, 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

## 2023-06-12 NOTE — ASSESSMENT & PLAN NOTE
History of moderate COPD, bronchiectasis and chronic cough   Maintained on Advair BID, Combivent and Azithromycin 250 mg daily  · Followed outpatient by pulmonology at [facility] AT THE [facility]

## 2023-06-12 NOTE — INCIDENTAL FINDINGS
The following findings require follow up:  Radiographic finding   Finding:  3 2 cm infrarenal abdominal aortic aneurysm   Follow up required: Abdominal Ct scan every 3 years for surveillance     Please notify the following clinician to assist with the follow up:   Dr Leonel Primrose

## 2023-06-12 NOTE — PROGRESS NOTES
[address] [place] [address]  Progress Note  Name: Owen Barney  MRN: [id number]  Unit/Bed#: E4 -01 I Date of Admission: 6/10/2023   Date of Service: 6/12/2023 I Hospital Day: 1    Assessment/Plan   * Chronic respiratory failure with hypoxia [facility])  Assessment & Plan  Present on admission O2 sat 86% on room air  Not maintained on oxygen at home, initially requiring 4 L nasal cannula oxygen  · Now requiring 2 L nasal cannula oxygen to maintain oxygen saturations greater than 88%  · History of COPD and pulmonary hypertension  Follows with cardiology and pulmonology at [facility]  · CTA: neg for PE    · Mild upper lobe centrilobular emphysema  · Nonspecific subtle perihilar infiltrates in the right middle lobe and portions of the right lower lobe noted  Small area of subpleural groundglass opacity in the left upper lobe also noted; could indicate early multifocal infection  · Trace right pleural effusion with dependent atelectasis and focal pleural thickening  · Procalcitonin negative  · Legionella and strep pneumo urine antigens negative  · We will continue IV ceftriaxone given patient's symptoms and radiographic findings  · Continue home inhalers and COPD prophylactic azithromycin 250 mg daily  · Incentive spirometry  · Respiratory protocol    Community acquired pneumonia  Assessment & Plan  · See above respiratory failure    Abdominal aortic aneurysm (AAA) without rupture ([facility] [address] )  Assessment & Plan  Incidental finding on CTA PE study of 3 2 cm infrarenal abdominal aortic aneurysm    · Recommend follow-up imaging every 3 years per current guidelines  · Incidental finding note complete    Coronary artery disease involving native coronary artery of native heart with angina pectoris [facility])  Assessment & Plan  CAD s/p CABG  · S/p Bio AVR at the time of CABG  · Follows with cardiology at [facility] AT THE [facility]    History of atrial fibrillation  Assessment & Plan  · Per [facility] cardiology paroxysmal atrial fibrillation detected in 2013 related to hypothyroid  Corrected since  Pulmonary hypertension (HCC)  Assessment & Plan  Known hx of pulmonary arterial hypertension  · CTA PE:  Dilatation of the main pulmonary artery noted suggestive of pulmonary arterial hypertension  Moderate COPD (chronic obstructive pulmonary disease) (HCC)  Assessment & Plan  History of moderate COPD, bronchiectasis and chronic cough  Maintained on Advair BID, Combivent and Azithromycin 250 mg daily  · Followed outpatient by pulmonology at [facility] AT THE [facility]    Elevated LFTs  Assessment & Plan  Minimal elevation in LFTs  · CTA: Cholelithiasis without evidence for acute cholecystitis or significant biliary ductal dilatation  · Right upper quadrant ultrasound with cholelithiasis, no evidence of acute cholecystitis  · LFTs downtrending    Elevated troponin  Assessment & Plan  Troponin 50-->52  · Received ASA bolus in ED  · EKG: Sinus rhythm with first-degree AV block, RBBB rate 76  Similar to EKG from [facility]   · ECHO EF 60-65%, normal wall motion  · Cycle troponin and EKG  · Monitor on telemetry         VTE Pharmacologic Prophylaxis: VTE Score: 6 High Risk (Score >/= 5) - Pharmacological DVT Prophylaxis Ordered: heparin  Sequential Compression Devices Ordered  Patient Centered Rounds: I performed bedside rounds with nursing staff today  Discussions with Specialists or Other Care Team Provider: none    Education and Discussions with Family / Patient: Attempted to update  (wife) via phone  Left voicemail  Total Time Spent on Date of Encounter in care of patient: 45 minutes This time was spent on one or more of the following: performing physical exam; counseling and coordination of care; obtaining or reviewing history; documenting in the medical record; reviewing/ordering tests, medications or procedures; communicating with other healthcare professionals and discussing with patient's family/caregivers      Current Length of Stay: 1 day(s)  Current Patient Status: Inpatient   Certification Statement: The patient will continue to require additional inpatient hospital stay due to IV ABX  Discharge Plan: Anticipate discharge tomorrow to home  Code Status: Level 1 - Full Code    Subjective:   Patient seen resting comfortably in bed  He just ate breakfast, is feeling well  Report off is improved since being admitted to the hospital   He denies any worsening shortness of breath, fevers, chills, chest pain  Objective:     Vitals:   Temp (24hrs), Av 6 °F (36 4 °C), Min:97 1 °F (36 2 °C), Max:98 2 °F (36 8 °C)    Temp:  [97 1 °F (36 2 °C)-98 2 °F (36 8 °C)] 97 6 °F (36 4 °C)  HR:  [58-84] 70  Resp:  [18-26] 20  BP: (127-159)/(65-91) 154/70  SpO2:  [91 %-94 %] 92 %  Body mass index is 29 66 kg/m²  Input and Output Summary (last 24 hours):   No intake or output data in the 24 hours ending 23 1116    Physical Exam:   Physical Exam  Vitals and nursing note reviewed  Constitutional:       General: He is not in acute distress  Appearance: Normal appearance  He is not ill-appearing, toxic-appearing or diaphoretic  HENT:      Head: Normocephalic and atraumatic  Cardiovascular:      Rate and Rhythm: Normal rate and regular rhythm  Heart sounds: No murmur heard  No friction rub  No gallop  Pulmonary:      Effort: Pulmonary effort is normal  No respiratory distress  Breath sounds: No wheezing, rhonchi or rales  Comments: Course breath sounds throughout bilateral lung fields  Abdominal:      General: Abdomen is flat  Bowel sounds are normal  There is no distension  Palpations: Abdomen is soft  Tenderness: There is no abdominal tenderness  Musculoskeletal:      Right lower leg: No edema  Left lower leg: No edema  Skin:     General: Skin is warm and dry  Coloration: Skin is not jaundiced or pale  Neurological:      General: No focal deficit present  Mental Status: He is alert  Mental status is at baseline  Additional Data:     Labs:  Results from last 7 days   Lab Units 06/12/23  0511   EOS PCT % 0   HEMATOCRIT % 39 5   HEMOGLOBIN g/dL 13 0   LYMPHS PCT % 10*   MONOS PCT % 7   NEUTROS PCT % 82*   PLATELETS Thousands/uL 213   WBC Thousand/uL 10 29*     Results from last 7 days   Lab Units 06/12/23  0511   ANION GAP mmol/L 7   ALBUMIN g/dL 3 6   ALK PHOS U/L 114*   ALT U/L 42   AST U/L 26   BUN mg/dL 29*   CALCIUM mg/dL 8 8   CHLORIDE mmol/L 107   CO2 mmol/L 24   CREATININE mg/dL 1 14   GLUCOSE RANDOM mg/dL 130   POTASSIUM mmol/L 4 1   SODIUM mmol/L 138   TOTAL BILIRUBIN mg/dL 0 52                 Results from last 7 days   Lab Units 06/12/23  0511 06/11/23  0036   PROCALCITONIN ng/ml 0 15 0 18       Lines/Drains:  Invasive Devices     Peripheral Intravenous Line  Duration           Peripheral IV 06/10/23 Left Forearm 1 day    Peripheral IV 06/11/23 Dorsal (posterior); Left Hand 1 day                      Imaging: Reviewed radiology reports from this admission including: chest CT scan    Recent Cultures (last 7 days):   Results from last 7 days   Lab Units 06/11/23  1415   LEGIONELLA URINARY ANTIGEN  Negative       Last 24 Hours Medication List:   Current Facility-Administered Medications   Medication Dose Route Frequency Provider Last Rate   • acetaminophen  650 mg Oral Q6H PRN SAIDA Santos     • umeclidinium  1 puff Inhalation Daily SAIDA Santos      And   • albuterol  2 puff Inhalation 4x Daily SAIDA Santos     • aluminum-magnesium hydroxide-simethicone  30 mL Oral Q6H PRN SAIDA Santos     • amLODIPine  5 mg Oral Daily SAIDA Santos     • artificial tear   Both Eyes HS SAIDA Santos     • azithromycin  500 mg Oral Daily Andrez Baldwin DO     • cefTRIAXone  1,000 mg Intravenous Q24H SAIDA Santos 1,000 mg (06/12/23 0550)   • dextromethorphan-guaiFENesin  10 mL Oral Q4H PRN SAIDA Santos     • finasteride  5 mg Oral Daily SADIA Corona     • fluticasone-vilanterol  1 puff Inhalation Daily SAIDA Corona     • furosemide  20 mg Oral Daily Erika Dais, CRNP     • glycerin-hypromellose-  1 drop Both Eyes 4x Daily ErikaROMERO MackNP     • guaiFENesin  600 mg Oral BID ErikaROMERO MackNP     • heparin (porcine)  5,000 Units Subcutaneous [facility] SAIDA Corona     • levalbuterol  0 63 mg Nebulization 4x Daily PRN ErikaROMERO MackNP     • levothyroxine  75 mcg Oral Early Morning ErikaSAIDA Mack     • losartan  25 mg Oral Daily ErikaSAIDA Mack     • nitroglycerin  0 4 mg Sublingual Q5 Min PRN ErikaSAIDA Mack     • ondansetron  4 mg Intravenous Q6H PRN ErikaROMERO MackNP     • pantoprazole  20 mg Oral Early Morning ErikaSAIDA Mack     • pravastatin  40 mg Oral Daily With [phone] [facility], SAIDA     • simethicone  80 mg Oral 4x Daily PRN SAIDA Corona          Today, Patient Was Seen By: Megan Martinez PA-C    **Please Note: This note may have been constructed using a voice recognition system  **

## 2023-06-12 NOTE — ASSESSMENT & PLAN NOTE
CAD s/p CABG  · S/p Bio AVR at the time of CABG  · Follows with cardiology at [facility] AT THE [facility]

## 2023-06-12 NOTE — ASSESSMENT & PLAN NOTE
Minimal elevation in LFTs  · CTA: Cholelithiasis without evidence for acute cholecystitis or significant biliary ductal dilatation  · Right upper quadrant ultrasound with cholelithiasis, no evidence of acute cholecystitis  · LFTs downtrending

## 2023-06-12 NOTE — ASSESSMENT & PLAN NOTE
Incidental finding on CTA PE study of 3 2 cm infrarenal abdominal aortic aneurysm    · Recommend follow-up imaging every 3 years per current guidelines  · Incidental finding note complete

## 2023-06-12 NOTE — ASSESSMENT & PLAN NOTE
Troponin 50-->52  · Received ASA bolus in ED  · EKG: Sinus rhythm with first-degree AV block, RBBB rate 76    Similar to EKG from [facility]   · ECHO EF 60-65%, normal wall motion  · Cycle troponin and EKG  · Monitor on telemetry

## 2023-06-13 ENCOUNTER — HOME HEALTH ADMISSION (OUTPATIENT)
Dept: HOME HEALTH SERVICES | Facility: HOME HEALTHCARE | Age: 88
End: 2023-06-13

## 2023-06-13 VITALS
WEIGHT: 189.38 LBS | OXYGEN SATURATION: 98 % | HEART RATE: 68 BPM | DIASTOLIC BLOOD PRESSURE: 76 MMHG | BODY MASS INDEX: 29.72 KG/M2 | HEIGHT: 67 IN | SYSTOLIC BLOOD PRESSURE: 148 MMHG | RESPIRATION RATE: 18 BRPM | TEMPERATURE: 98.4 F

## 2023-06-13 PROBLEM — J96.01 ACUTE RESPIRATORY FAILURE WITH HYPOXIA (HCC): Status: RESOLVED | Noted: 2023-06-11 | Resolved: 2023-06-13

## 2023-06-13 PROBLEM — J96.01 ACUTE RESPIRATORY FAILURE WITH HYPOXIA (HCC): Status: ACTIVE | Noted: 2023-06-11

## 2023-06-13 LAB
ANION GAP SERPL CALCULATED.3IONS-SCNC: 6 MMOL/L (ref 4–13)
BUN SERPL-MCNC: 27 MG/DL (ref 5–25)
CALCIUM SERPL-MCNC: 8.4 MG/DL (ref 8.4–10.2)
CHLORIDE SERPL-SCNC: 107 MMOL/L (ref 96–108)
CO2 SERPL-SCNC: 25 MMOL/L (ref 21–32)
CREAT SERPL-MCNC: 1.03 MG/DL (ref 0.6–1.3)
ERYTHROCYTE [DISTWIDTH] IN BLOOD BY AUTOMATED COUNT: 12.9 % (ref 11.6–15.1)
GFR SERPL CREATININE-BSD FRML MDRD: 61 ML/MIN/1.73SQ M
GLUCOSE SERPL-MCNC: 99 MG/DL (ref 65–140)
HCT VFR BLD AUTO: 41.5 % (ref 36.5–49.3)
HGB BLD-MCNC: 13.5 G/DL (ref 12–17)
MCH RBC QN AUTO: 29.3 PG (ref 26.8–34.3)
MCHC RBC AUTO-ENTMCNC: 32.5 G/DL (ref 31.4–37.4)
MCV RBC AUTO: 90 FL (ref 82–98)
PLATELET # BLD AUTO: 231 THOUSANDS/UL (ref 149–390)
PMV BLD AUTO: 9.8 FL (ref 8.9–12.7)
POTASSIUM SERPL-SCNC: 3.8 MMOL/L (ref 3.5–5.3)
RBC # BLD AUTO: 4.61 MILLION/UL (ref 3.88–5.62)
SODIUM SERPL-SCNC: 138 MMOL/L (ref 135–147)
WBC # BLD AUTO: 7.88 THOUSAND/UL (ref 4.31–10.16)

## 2023-06-13 PROCEDURE — 80048 BASIC METABOLIC PNL TOTAL CA: CPT | Performed by: PHYSICIAN ASSISTANT

## 2023-06-13 PROCEDURE — 92610 EVALUATE SWALLOWING FUNCTION: CPT

## 2023-06-13 PROCEDURE — 99239 HOSP IP/OBS DSCHRG MGMT >30: CPT | Performed by: PHYSICIAN ASSISTANT

## 2023-06-13 PROCEDURE — 94761 N-INVAS EAR/PLS OXIMETRY MLT: CPT

## 2023-06-13 PROCEDURE — 85027 COMPLETE CBC AUTOMATED: CPT | Performed by: PHYSICIAN ASSISTANT

## 2023-06-13 RX ORDER — AMOXICILLIN AND CLAVULANATE POTASSIUM 875; 125 MG/1; MG/1
1 TABLET, FILM COATED ORAL EVERY 12 HOURS SCHEDULED
Qty: 6 TABLET | Refills: 0 | Status: SHIPPED | OUTPATIENT
Start: 2023-06-13 | End: 2023-06-16

## 2023-06-13 RX ADMIN — FLUTICASONE FUROATE AND VILANTEROL TRIFENATATE 1 PUFF: 200; 25 POWDER RESPIRATORY (INHALATION) at 08:08

## 2023-06-13 RX ADMIN — GLYCERIN, HYPROMELLOSE, POLYETHYLENE GLYCOL 1 DROP: .2; .2; 1 LIQUID OPHTHALMIC at 08:08

## 2023-06-13 RX ADMIN — HEPARIN SODIUM 5000 UNITS: 5000 INJECTION INTRAVENOUS; SUBCUTANEOUS at 13:22

## 2023-06-13 RX ADMIN — PANTOPRAZOLE SODIUM 20 MG: 20 TABLET, DELAYED RELEASE ORAL at 05:46

## 2023-06-13 RX ADMIN — FINASTERIDE 5 MG: 5 TABLET, FILM COATED ORAL at 08:08

## 2023-06-13 RX ADMIN — GLYCERIN, HYPROMELLOSE, POLYETHYLENE GLYCOL 1 DROP: .2; .2; 1 LIQUID OPHTHALMIC at 13:22

## 2023-06-13 RX ADMIN — GUAIFENESIN 600 MG: 600 TABLET, EXTENDED RELEASE ORAL at 08:08

## 2023-06-13 RX ADMIN — AMLODIPINE BESYLATE 5 MG: 5 TABLET ORAL at 08:08

## 2023-06-13 RX ADMIN — AZITHROMYCIN MONOHYDRATE 500 MG: 250 TABLET ORAL at 08:08

## 2023-06-13 RX ADMIN — ALBUTEROL SULFATE 2 PUFF: 90 AEROSOL, METERED RESPIRATORY (INHALATION) at 08:08

## 2023-06-13 RX ADMIN — FUROSEMIDE 20 MG: 20 TABLET ORAL at 08:08

## 2023-06-13 RX ADMIN — CEFTRIAXONE 1000 MG: 1 INJECTION, SOLUTION INTRAVENOUS at 05:47

## 2023-06-13 RX ADMIN — ALBUTEROL SULFATE 2 PUFF: 90 AEROSOL, METERED RESPIRATORY (INHALATION) at 13:22

## 2023-06-13 RX ADMIN — HEPARIN SODIUM 5000 UNITS: 5000 INJECTION INTRAVENOUS; SUBCUTANEOUS at 05:50

## 2023-06-13 RX ADMIN — LOSARTAN POTASSIUM 25 MG: 25 TABLET, FILM COATED ORAL at 08:08

## 2023-06-13 RX ADMIN — LEVOTHYROXINE SODIUM 75 MCG: 75 TABLET ORAL at 05:46

## 2023-06-13 NOTE — CASE MANAGEMENT
Case Management Discharge Planning Note    Patient name Antoinette Ware  Location [address] [address] [address] 446/E4 MS 46-* MRN [id number]  : 1929 Date 2023       Current Admission Date: 6/10/2023  Current Admission Diagnosis:Chronic respiratory failure with hypoxia [facility])   Patient Active Problem List    Diagnosis Date Noted   • Elevated troponin 2023   • Elevated LFTs 2023   • Moderate COPD (chronic obstructive pulmonary disease) ([facility] [address] ) 2023   • Pulmonary hypertension ([facility] [address] ) 2023   • [facility] 2023   • History of atrial fibrillation 2023   • Coronary artery disease involving native coronary artery of native heart with angina pectoris ([facility] [address] ) 2023   • Abdominal aortic aneurysm (AAA) without rupture ([facility] ) 2023   • Community acquired pneumonia 2023      LOS (days): 2  Geometric Mean LOS (GMLOS) (days):   Days to GMLOS:     OBJECTIVE:  Risk of Unplanned Readmission Score: 13 21         Current admission status: Inpatient   Preferred Pharmacy:   [facility] TO E[facility]PRESCR[facility]E  No address on file      Primary Care Provider: No primary care provider on file      Primary Insurance: [facility]  Secondary Insurance:     DISCHARGE DETAILS:    Discharge planning discussed with[de-identified] spouse Majo Oleary of Choice: Yes  Comments - Freedom of Choice:  [facility] was chosen  CM contacted family/caregiver?: Yes     Contacts  Patient Contacts: Delia Dashkjose martin  Relationship to Patient[de-identified] Family  Contact Method: Phone  Phone Number: [phone]  Reason/Outcome: Continuity of Care, Discharge Planning, Emergency [address]         Is the patient interested in [address] at discharge?: Yes  [address] requested[de-identified] Occupational Therapy, Physical [address] Name[de-identified] [facility] Provider[de-identified] PCP  Home Health Services Needed[de-identified] Strengthening/Theraputic Exercises to Improve Function, Gait/ADL Training, Evaluate Functional Status and Safety  Homebound Criteria Met[de-identified] Requires the Assistance of Another Person for Safe Ambulation or to Leave the Home  Supporting Clincal Findings[de-identified] Limited Endurance, Fatigues Easliy in [employer]    Other Referral/Resources/Interventions Provided:  Interventions: [facility]  Referral Comments: home health referrals made    Treatment Team Recommendation: Home with [address] [place] [address]  Discharge Destination Plan[de-identified] Home with Ghada at Discharge : Family     ETA of Transport (Date): 06/13/23     Additional Comments: Patient recommended for home health, referrals made spouse chose [facility] for therapy  Spouse will transport patient home  No home O2 needed per RT

## 2023-06-13 NOTE — ASSESSMENT & PLAN NOTE
CAD s/p CABG  · S/p Bio AVR at the time of CABG  · Follows with cardiology at [facility] AT THE [facility]

## 2023-06-13 NOTE — ASSESSMENT & PLAN NOTE
History of moderate COPD, bronchiectasis and chronic cough   Maintained on Advair BID, Combivent and Azithromycin 250 mg daily  · Followed outpatient by pulmonology at [facility] AT THE [facility]

## 2023-06-13 NOTE — DISCHARGE SUMMARY
[address] [facility]  Discharge- Randy Feldman 1/6/1929, 80 y o  male MRN: [id number]  Unit/Bed#: E4 -01 Encounter: [id number]  Primary Care Provider: No primary care provider on file  Date and time admitted to hospital: 6/10/2023 11:11 PM    * Acute respiratory failure with hypoxia (HCC)-resolved as of 6/13/2023  Assessment & Plan  Present on admission O2 sat 86% on room air  Not maintained on oxygen at home, initially requiring 4 L nasal cannula oxygen  · No longer requiring supplemental oxygen  · History of COPD and pulmonary hypertension  Follows with cardiology and pulmonology at [facility]  · CTA: neg for PE    · Mild upper lobe centrilobular emphysema  · Nonspecific subtle perihilar infiltrates in the right middle lobe and portions of the right lower lobe noted  Small area of subpleural groundglass opacity in the left upper lobe also noted; could indicate early multifocal infection  · Trace right pleural effusion with dependent atelectasis and focal pleural thickening  · Procalcitonin negative  · Legionella and strep pneumo urine antigens negative  · Received 3 days of IV Rocephin, discharged on oral Augmentin for an additional 3 days to complete a full 6 days of antibiotic therapy  · Continue home inhalers and COPD prophylactic azithromycin 250 mg daily  · Incentive spirometry  · Home oxygen evaluation performed, no need for oxygen    Community acquired pneumonia  Assessment & Plan  · See above respiratory failure    Abdominal aortic aneurysm (AAA) without rupture (HCC)  Assessment & Plan  Incidental finding on CTA PE study of 3 2 cm infrarenal abdominal aortic aneurysm    · Recommend follow-up imaging every 3 years per current guidelines  · Incidental finding note complete    Coronary artery disease involving native coronary artery of native heart with angina pectoris [facility])  Assessment & Plan  CAD s/p CABG  · S/p Bio AVR at the time of CABG  · Follows with cardiology at [facility]    History of atrial fibrillation  Assessment & Plan  · Per [facility] cardiology paroxysmal atrial fibrillation detected in 2013 related to hypothyroid  Corrected since  Pulmonary hypertension (HCC)  Assessment & Plan  Known hx of pulmonary arterial hypertension  · CTA PE:  Dilatation of the main pulmonary artery noted suggestive of pulmonary arterial hypertension  Moderate COPD (chronic obstructive pulmonary disease) (HCC)  Assessment & Plan  History of moderate COPD, bronchiectasis and chronic cough  Maintained on Advair BID, Combivent and Azithromycin 250 mg daily  · Followed outpatient by pulmonology at [facility]    Elevated LFTs  Assessment & Plan  Minimal elevation in LFTs  · CTA: Cholelithiasis without evidence for acute cholecystitis or significant biliary ductal dilatation  · Right upper quadrant ultrasound with cholelithiasis, no evidence of acute cholecystitis  · LFTs improved    Elevated troponin  Assessment & Plan  Troponin 50-->52  · Received ASA bolus in ED  · EKG: Sinus rhythm with first-degree AV block, RBBB rate 76  Similar to EKG from [facility]   · ECHO EF 60-65%, normal wall motion  · Cycle troponin and EKG    Medical Problems     Resolved Problems  Date Reviewed: 6/13/2023          Resolved    * (Principal) Acute respiratory failure with hypoxia ([address] ) 6/13/2023     Resolved by  Sergo Gutiérrez PA-C        Discharging Physician / Practitioner: Sergo Gutiérrez PA-C  PCP: No primary care provider on file  Admission Date:   Admission Orders (From admission, onward)     Ordered        06/11/23 0609  INPATIENT ADMISSION  Once                      Discharge Date: 06/13/23    Consultations During Hospital Stay:  · none    Procedures Performed:   · none    Significant Findings / Test Results:   US right upper quadrant  Result Date: 6/12/2023    Impression: 1  Cholelithiasis without sonographic evidence of acute cholecystitis  The gallbladder is contracted, limiting evaluation   The common bile duct measures 8 mm in caliber which is at the upper limits of normal for a patient of this age  Workstation performed: [id number]     CTA chest (pe study) abdomen pelvis routine contrast  Result Date: 6/11/2023    Impression: 1  No acute pulmonary embolism to the segmental level with evaluation of the subsegmental arterial branches limited by motion artifact  Dilatation of the main pulmonary artery noted suggestive of pulmonary arterial hypertension  2   Mild upper lobe predominant centrilobular emphysema  Nonspecific subtle perihilar infiltrates in the right middle lobe and portions of the right lower lobe noted  Small area of subpleural groundglass opacity in the left upper lobe also noted  Findings could indicate early multifocal infection  Trace right pleural effusion with dependent atelectasis and focal pleural thickening  3   Multiple nonspecific mildly prominent mediastinal and hilar lymph nodes noted with the largest measuring up to 1 9 x 1 2 cm  4   Mild cardiomegaly  5   Calcific atherosclerosis of the aortic arch region, proximal thoracic vessels, and coronary arteries noted  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  6   3 2 cm infrarenal abdominal aortic aneurysm, recommend follow-up imaging every 3 years per current guidelines  7   Cholelithiasis without evidence for acute cholecystitis or significant biliary ductal dilatation  8   Colonic diverticulosis without evidence for acute diverticulitis  No evidence of bowel obstruction, mesenteric inflammation, obstructive uropathy, free air, or free fluid  9   Prominent size of the prostate gland  10   Mesenteric fat-containing renal hernias, right greater than left  Workstation performed: [id number]     Incidental Findings  · See above  · I reviewed the above mentioned incidental findings with the patient and/or family and they expressed understanding      Test Results Pending at Discharge (will require follow up):   · none     Outpatient Tests "Requested:  · none    Complications:  none    Reason for Admission: Shortness of breath    Hospital Course:   Agapito Jacinto is a 80 y o  male patient with a past medical history of coronary artery disease, pulmonary hypertension, COPD, atrial fibrillation who originally presented to the hospital on 6/10/2023 due to shortness of breath  Upon arrival, the patient saturating in the 80s on room air, he was placed on 4 L of nasal cannula oxygen at that time  Given elevated D-dimer, CTA was obtained which was negative for PE however did note left upper lobe pneumonia  Subsequently initiated on IV Rocephin  The patient was admitted to the hospital and received 3 days of IV antibiotic therapy, his oxygen requirements decreased and home oxygen evaluation was performed on the day of discharge  The patient did not require any oxygen on discharge  He will be discharged with an additional 3 days of oral antibiotic therapy  He does follow with pulmonology at [facility] and he is maintained on prophylactic azithromycin daily which he will continue as well  Please see above list of diagnoses and related plan for additional information  Condition at Discharge: stable    Discharge Day Visit / Exam:   Subjective: The patient is seen resting comfortably in bed  He is eager to go home today, he denies any worsening shortness of breath or cough, denies any fevers or chills  States he is feeling well overall  Vitals: Blood Pressure: 148/76 (06/13/23 0731)  Pulse: 68 (06/13/23 0731)  Temperature: 98 4 °F (36 9 °C) (06/13/23 0731)  Temp Source: Tympanic (06/13/23 0731)  Respirations: 18 (06/13/23 0731)  Height: 5' 7\" (170 2 cm) (06/11/23 8598)  Weight - Scale: 85 9 kg (189 lb 6 oz) (06/11/23 0832)  SpO2: 98 % (06/13/23 0731)  Exam:   Physical Exam  Vitals and nursing note reviewed  Constitutional:       General: He is not in acute distress  Appearance: Normal appearance   He is not ill-appearing, toxic-appearing " or diaphoretic  HENT:      Head: Normocephalic and atraumatic  Cardiovascular:      Rate and Rhythm: Normal rate and regular rhythm  Heart sounds: No murmur heard  No friction rub  No gallop  Pulmonary:      Effort: Pulmonary effort is normal  No respiratory distress  Breath sounds: Normal breath sounds  No wheezing, rhonchi or rales  Abdominal:      General: Abdomen is flat  Bowel sounds are normal  There is no distension  Palpations: Abdomen is soft  Tenderness: There is no abdominal tenderness  Musculoskeletal:      Right lower leg: No edema  Left lower leg: No edema  Skin:     General: Skin is warm and dry  Coloration: Skin is not jaundiced or pale  Neurological:      General: No focal deficit present  Mental Status: He is alert  Mental status is at baseline  Discussion with Family: Patient declined call to   Discharge instructions/Information to patient and family:   See after visit summary for information provided to patient and family  Provisions for Follow-Up Care:  See after visit summary for information related to follow-up care and any pertinent home health orders  Disposition:   Home with VNA Services (Reminder: Complete face to face encounter)    Planned Readmission: n/a     Discharge Statement:  I spent 45 minutes discharging the patient  This time was spent on the day of discharge  I had direct contact with the patient on the day of discharge  Greater than 50% of the total time was spent examining patient, answering all patient questions, arranging and discussing plan of care with patient as well as directly providing post-discharge instructions  Additional time then spent on discharge activities  Discharge Medications:  See after visit summary for reconciled discharge medications provided to patient and/or family        **Please Note: This note may have been constructed using a voice recognition system**

## 2023-06-13 NOTE — ASSESSMENT & PLAN NOTE
Present on admission O2 sat 86% on room air  Not maintained on oxygen at home, initially requiring 4 L nasal cannula oxygen  · No longer requiring supplemental oxygen  · History of COPD and pulmonary hypertension  Follows with cardiology and pulmonology at [facility]  · CTA: neg for PE    · Mild upper lobe centrilobular emphysema  · Nonspecific subtle perihilar infiltrates in the right middle lobe and portions of the right lower lobe noted    Small area of subpleural groundglass opacity in the left upper lobe also noted; could indicate early multifocal infection  · Trace right pleural effusion with dependent atelectasis and focal pleural thickening  · Procalcitonin negative  · Legionella and strep pneumo urine antigens negative  · Received 3 days of IV Rocephin, discharged on oral Augmentin for an additional 3 days to complete a full 6 days of antibiotic therapy  · Continue home inhalers and COPD prophylactic azithromycin 250 mg daily  · Incentive spirometry  · Home oxygen evaluation performed, no need for oxygen

## 2023-06-13 NOTE — DISCHARGE INSTR - AVS FIRST PAGE
Dear Maricel Turcios,     It was our pleasure to care for you here at [facility]  It is our hope that we were always able to exceed the expected standards for your care during your stay  You were hospitalized due to pneumonia  You were cared for on the 4th floor under the service of Fredy Cutler PA-C with the [facility] who covers for your primary care physician (PCP), No primary care provider on file  , while you were hospitalized  If you have any questions or concerns related to this hospitalization, you may contact us at [phone]  For follow up as well as medication refills, we recommend that you follow up with your primary care physician  A registered nurse will reach out to you by phone within a few days after your discharge to answer any additional questions that you may have after going home  However, at this time we provide for you here, the most important instructions / recommendations at discharge:     Notable Medication Adjustments -   Take amoxicillin-clavulanate (Augmentin) 875-125 mg twice daily for 3 days  Testing Required after Discharge -   none  Incidental findings that Require Follow Up   Abdominal Aortic Aneurysm  Important Follow Up Information -   none  Other Instructions -   none  Please review this entire after visit summary as additional general instructions including medication list, appointments, activity, diet, any pertinent wound care, and other additional recommendations from your care team that may be provided for you        Sincerely,     Fredy Cutler PA-C

## 2023-06-13 NOTE — PLAN OF CARE
Problem: PAIN - ADULT  Goal: Verbalizes/displays adequate comfort level or baseline comfort level  Description: Interventions:  - Encourage patient to monitor pain and request assistance  - Assess pain using appropriate pain scale  - Administer analgesics based on type and severity of pain and evaluate response  - Implement non-pharmacological measures as appropriate and evaluate response  - Consider cultural and social influences on pain and pain management  - Notify physician/advanced practitioner if interventions unsuccessful or patient reports new pain  Outcome: Progressing     Problem: INFECTION - ADULT  Goal: Absence or prevention of progression during hospitalization  Description: INTERVENTIONS:  - Assess and monitor for signs and symptoms of infection  - Monitor lab/diagnostic results  - Monitor all insertion sites, i e  indwelling lines, tubes, and drains  - Monitor endotracheal if appropriate and nasal secretions for changes in amount and color  - [place] appropriate cooling/warming therapies per order  - Administer medications as ordered  - Instruct and encourage patient and family to use good hand hygiene technique  - Identify and instruct in appropriate isolation precautions for identified infection/condition  Outcome: Progressing  Goal: Absence of fever/infection during neutropenic period  Description: INTERVENTIONS:  - Monitor WBC    Outcome: Progressing     Problem: SAFETY ADULT  Goal: Patient will remain free of falls  Description: INTERVENTIONS:  - Educate patient/family on patient safety including physical limitations  - Instruct patient to call for assistance with activity   - Consult OT/PT to assist with strengthening/mobility   - Keep Call bell within reach  - Keep bed low and locked with side rails adjusted as appropriate  - Keep care items and personal belongings within reach  - Initiate and maintain comfort rounds  - Make Fall Risk Sign visible to staff  - Offer Toileting every  Hours, in advance of need  - Initiate/Maintain alarm  - Obtain necessary fall risk management equipment:   - Apply yellow socks and bracelet for high fall risk patients  - Consider moving patient to room near nurses station  Outcome: Progressing  Goal: Maintain or return to baseline ADL function  Description: INTERVENTIONS:  -  Assess patient's ability to carry out ADLs; assess patient's baseline for ADL function and identify physical deficits which impact ability to perform ADLs (bathing, care of mouth/teeth, toileting, grooming, dressing, etc )  - Assess/evaluate cause of self-care deficits   - Assess range of motion  - Assess patient's mobility; develop plan if impaired  - Assess patient's need for assistive devices and provide as appropriate  - Encourage maximum independence but intervene and supervise when necessary  - Involve family in performance of ADLs  - Assess for home care needs following discharge   - Consider OT consult to assist with ADL evaluation and planning for discharge  - Provide patient education as appropriate  Outcome: Progressing  Goal: Maintains/Returns to pre admission functional level  Description: INTERVENTIONS:  - Perform BMAT or MOVE assessment daily    - Set and communicate daily mobility goal to care team and patient/family/caregiver  - Collaborate with rehabilitation services on mobility goals if consulted  - Perform Range of Motion  times a day  - Reposition patient every  hours    - Dangle patient  times a day  - Stand patient  times a day  - Ambulate patient  times a day  - Out of bed to chair  times a day   - Out of bed for meals times a day  - Out of bed for toileting  - Record patient progress and toleration of activity level   Outcome: Progressing     Problem: DISCHARGE PLANNING  Goal: Discharge to home or other facility with appropriate resources  Description: INTERVENTIONS:  - Identify barriers to discharge w/patient and caregiver  - Arrange for needed discharge resources and transportation as appropriate  - Identify discharge learning needs (meds, wound care, etc )  - Arrange for interpretive services to assist at discharge as needed  - Refer to Case Management Department for coordinating discharge planning if the patient needs post-hospital services based on physician/advanced practitioner order or complex needs related to functional status, cognitive ability, or social support system  Outcome: Progressing     Problem: Knowledge Deficit  Goal: Patient/family/caregiver demonstrates understanding of disease process, treatment plan, medications, and discharge instructions  Description: Complete learning assessment and assess knowledge base    Interventions:  - Provide teaching at level of understanding  - Provide teaching via preferred learning methods  Outcome: Progressing

## 2023-06-13 NOTE — RESPIRATORY THERAPY NOTE
Home Oxygen Qualifying Test     Patient name: Vanessa Brown        : 1929   Date of Test:  2023  Diagnosis:    Home Oxygen Test:    **Medicare Guidelines require item(s) 1-5 on all ambulatory patients or 1 and 2 on non-ambulatory patients  1  Baseline SPO2 on Room Air at rest 94-95 %   a  If <= 88% on Room Air add O2 via NC to obtain SpO2 >=88%  If LPM needed, document LPM needed to reach =>88%    2  SPO2 during exertion on Room Air 90%  a  During exertion monitor SPO2  If SPO2 increases >=89%, do not add supplemental oxygen    3  SPO2 on Oxygen at Rest   NA    4  SPO2 during exertion on Oxygen  NA    5  Test performed during exertion activity  Ambulation as wilber w/walker assist for approx 80 feet X 5-6 min  []  Supplemental Home Oxygen is indicated  [x]  Client does not qualify for home oxygen  Respiratory Additional Notes- Pt displays pursed lipped breathing during exertion although denies signif SOB  Pt encouraged to take frequent breaks when ambulating after disch    Mikhail Damon, RT

## 2023-06-13 NOTE — ASSESSMENT & PLAN NOTE
Minimal elevation in LFTs  · CTA: Cholelithiasis without evidence for acute cholecystitis or significant biliary ductal dilatation  · Right upper quadrant ultrasound with cholelithiasis, no evidence of acute cholecystitis  · LFTs improved

## 2023-06-13 NOTE — ASSESSMENT & PLAN NOTE
Troponin 50-->52  · Received ASA bolus in ED  · EKG: Sinus rhythm with first-degree AV block, RBBB rate 76    Similar to EKG from [facility]   · ECHO EF 60-65%, normal wall motion  · Cycle troponin and EKG

## 2023-06-13 NOTE — SPEECH THERAPY NOTE
Speech Language/Pathology  Speech/Language Pathology  Assessment    Patient Name: Jocelyne Patterson  QXRHU'T Date: 6/13/2023     Problem List  Principal Problem:    Chronic respiratory failure with hypoxia ([facility] )  Active Problems:    Elevated troponin    Elevated LFTs    Moderate COPD (chronic obstructive pulmonary disease) (HCC)    Pulmonary hypertension (HCC)    History of atrial fibrillation    Coronary artery disease involving native coronary artery of native heart with angina pectoris ([facility] [address] )    Abdominal aortic aneurysm (AAA) without rupture ([facility] )    Community acquired pneumonia    Past Medical History  History reviewed  No pertinent past medical history  Past Surgical History  History reviewed  No pertinent surgical history  Bedside Swallow Evaluation:    Summary:  Pt presented w/ no oral/pharyngeal s/s  Seen briefly in am and again at lunch time  Had a cheese steak hoagie at lunch, liquids in am and pm (ate entire breakfast)  Mastication, bolus control, formation, and transfer were WNL  Swallows prompt  No cough or wet vocal quality  On O2 in am but not at lunch time  Denied any difficulty chewing or swallowing  Recommendations:  Diet: regular  Liquid: thin  Meds:as tolerated  Supervision: distant/prn  Positioning:Upright  Pt to take PO/Meds only when fully alert and upright  Oral care  Aspiration precautions  Reflux precautions  Eval only, No f/u tx indicated       Consider consult w/:  Rehab  Pulmonary  Nutrition    H&P/Admit info/ pertinent provider notes: (PMH noted above)  Physical exam  General elderly male resting comfortably on 4 L nasal cannula  HEENT normocephalic atraumatic  Cardiac regular rate and rhythm no murmurs rubs or gallops  Pulmonary 4 L nasal cannula, coarse breath sounds  Abdomen soft nontender nondistended  Extremities no cyanosis or edema noted  Neurological alert and oriented  Gerhardt Anton, DO 06/11/23  Chief Complaint:     History of Present Illness:  Jocelyne Patterson is a 80 y o  male who presents with dyspnea  Unable to obtain HPI  Per ED he presented with complaints of shortness of breath, worsening overnight  Spouse reported fevers, diaphoresis, cough and yellow sputum  Noted with hypoxia requiring supplemental O2  Now maintained on home oxygen      Special Studies:  cta chest: 6/11/23  1  No acute pulmonary embolism to the segmental level with evaluation of the subsegmental arterial branches limited by motion artifact  Dilatation of the main pulmonary artery noted suggestive of pulmonary arterial hypertension  2   Mild upper lobe predominant centrilobular emphysema  Nonspecific subtle perihilar infiltrates in the right middle lobe and portions of the right lower lobe noted  Small area of subpleural groundglass opacity in the left upper lobe also noted  Findings could indicate early multifocal infection  Trace right pleural effusion with dependent atelectasis and focal pleural thickening  3   Multiple nonspecific mildly prominent mediastinal and hilar lymph nodes noted with the largest measuring up to 1 9 x 1 2 cm  4   Mild cardiomegaly  5   Calcific atherosclerosis of the aortic arch region, proximal thoracic vessels, and coronary arteries noted  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 no  If yes, was nursing notified/was it addressed?  na    Reason for consult:  R/o aspiration  Determine safest and least restrictive diet  respiratory compromise    Precautions:  Fall    Food Allergies:  none   Current Diet:  regular, cardiac   Premorbid diet: Regular, ? If cardiac   O2 requirement:  4L nc in am  RA pm   Social/Prior living  home w/ spouse   Voice/Speech:  wnl, [place]   Follows commands:  basic   Cognitive status:  alert     Oral [facility] exam:  Dentition:natural  Lips (VII):+  Tongue (XII):+  Secretion management:+    Esophageal stage:  ? Reliability  Reported oiccasional heartburn but then later stated he only burps once in a while       Results d/w:  Pt, nursing

## 2023-06-13 NOTE — PLAN OF CARE
Problem: INFECTION - ADULT  Goal: Absence or prevention of progression during hospitalization  Description: INTERVENTIONS:  - Assess and monitor for signs and symptoms of infection  - Monitor lab/diagnostic results  - Monitor all insertion sites, i e  indwelling lines, tubes, and drains  - Monitor endotracheal if appropriate and nasal secretions for changes in amount and color  - [facility] appropriate cooling/warming therapies per order  - Administer medications as ordered  - Instruct and encourage patient and family to use good hand hygiene technique  - Identify and instruct in appropriate isolation precautions for identified infection/condition  Outcome: Progressing  Goal: Absence of fever/infection during neutropenic period  Description: INTERVENTIONS:  - Monitor WBC    Outcome: Progressing     Problem: PAIN - ADULT  Goal: Verbalizes/displays adequate comfort level or baseline comfort level  Description: Interventions:  - Encourage patient to monitor pain and request assistance  - Assess pain using appropriate pain scale  - Administer analgesics based on type and severity of pain and evaluate response  - Implement non-pharmacological measures as appropriate and evaluate response  - Consider cultural and social influences on pain and pain management  - Notify physician/advanced practitioner if interventions unsuccessful or patient reports new pain  Outcome: Progressing

## 2023-06-14 NOTE — UTILIZATION REVIEW
NOTIFICATION OF ADMISSION DISCHARGE   This is a Notification of Discharge from [facility]  Please be advised that this patient has been discharge from our facility  Below you will find the admission and discharge date and time including the patient’s disposition  UTILIZATION REVIEW CONTACT:  Lesley Pa  Utilization   Network Utilization Review Department  Phone: [phone] x carefully listen to the prompts  All voicemails are confidential   Email: [email or web address]     ADMISSION INFORMATION  PRESENTATION DATE: 6/10/2023 11:11 PM  OBERVATION ADMISSION DATE:   INPATIENT ADMISSION DATE: 6/11/23  6:09 AM   DISCHARGE DATE: 6/13/2023  2:30 PM   DISPOSITION:Home with Home Health Care    IMPORTANT INFORMATION:  Send all requests for admission clinical reviews, approved or denied determinations and any other requests to dedicated fax number below belonging to the campus where the patient is receiving treatment   List of dedicated fax numbers:  [address] DENIALS (Administrative/Medical Necessity) [phone]   [address] (Maternity/NICU/Pediatrics) [phone]   [facility] [phone]   [facility] [phone]   [address] [phone]   [address] [facility] [phone]   90 [facility] [phone]   [address] [place] [address] 119 [phone]   [facility]  [phone]   [address] [facility] [phone]   412 [facility] [address] [facility] [phone]

## 2023-06-15 ENCOUNTER — HOME CARE VISIT (OUTPATIENT)
Dept: HOME HEALTH SERVICES | Facility: HOME HEALTHCARE | Age: 88
End: 2023-06-15

## 2023-06-15 NOTE — CASE COMMUNICATION
Assessed not admit:  Assessed on 6/15/23 for [facility] services following recent hospitalization at [facility] for pneumonia  Pt and his spouse report pt is at baseline with ADLs and IADLs  They report no difficulty with exiting the home at this time  Pt is SOB at time however he reports it is at his baseline and currently is not impacting his participation in ADLs  Discuss options of outpatient therapy and pt feels he is not in need of outpatien t at this time  Pt not admitted to [facility] services